# Patient Record
Sex: FEMALE | Race: WHITE | NOT HISPANIC OR LATINO | Employment: OTHER | URBAN - METROPOLITAN AREA
[De-identification: names, ages, dates, MRNs, and addresses within clinical notes are randomized per-mention and may not be internally consistent; named-entity substitution may affect disease eponyms.]

---

## 2024-08-19 LAB
INTERPRETATION: NORMAL
SIGNIFICANT RESULTS: NORMAL
SPECIMEN DESCRIPTION: NORMAL
TEST DETAILS, MDL: NORMAL

## 2024-08-19 PROCEDURE — 81479 UNLISTED MOLECULAR PATHOLOGY: CPT | Performed by: PATHOLOGY

## 2024-08-23 ENCOUNTER — LAB REQUISITION (OUTPATIENT)
Dept: LAB | Facility: CLINIC | Age: 64
End: 2024-08-23

## 2024-08-23 ENCOUNTER — TRANSCRIBE ORDERS (OUTPATIENT)
Dept: OTHER | Age: 64
End: 2024-08-23

## 2024-08-23 ENCOUNTER — PATIENT OUTREACH (OUTPATIENT)
Dept: ONCOLOGY | Facility: CLINIC | Age: 64
End: 2024-08-23
Payer: COMMERCIAL

## 2024-08-23 DIAGNOSIS — C54.1 ENDOMETRIAL CANCER (H): Primary | ICD-10-CM

## 2024-08-23 NOTE — PROGRESS NOTES
"New Patient Hematology / Oncology Nurse Navigator Note     Referral Date: 8/23/24    Referring provider:   Scott Silverman MD   [address]   [place] [id number] [phone] (Work)   [phone] (Fax)     Referring Clinic/Organization: [facility] [place]   [address]   [address]   [place] [id number] [phone]     Referred to: GynOnc    Requested provider (if applicable): First available - did not specify     Evaluation for : \"Endometrial cancer\"     Clinical History (per Nurse review of records provided):    8/19/24 Path:  Final Dx    A.  Endometrium, Endometrial curettings and fibroid, curettage:    High-grade carcinoma possibly arising in a polyp, favor serous,  pending POLE mutational status, see comment   7/31/24 Pelvic US:  IMPRESSION:   1. Circumscribed hyperechoic mass centered in the endometrium measuring up to   1.9 cm. Differential considerations include a submucosal fibroid, large   endometrial polyp and endometrial malignancy. Recommend tissue sampling for   further evaluation. Female pelvic MRI may provide additional diagnostic   information, could be considered for further evaluation.   2. Fibroid uterus.   3. No suspicious adnexal mass.  -- BOOKMARKED   -- BOOKMARKED   -- BOOKMARKED    Clinical Assessment / Barriers to Care (Per Nurse):  Pt lives in [facility]     Records Location: Care Everywhere     Records Needed:   Images from [facility]    Additional testing needed prior to consult:   Pending input from team    Referral updates and Plan:   OUTGOING CALL to pt:  Introduced my role as nurse navigator with [facility] Hematology/Oncology dept and that we have recd the referral to GynOnc from Dr Silverman.  Pt confirms they are aware of the referral and ready to schedule. She is willing to go to any location, hoping to be seen ASAP.     -Explained to pt that I am working to arrange urgent consult with GynOnc, then she will receive " a call from our scheduling intake team and provided our call-back number below if needed.    Message to Dr. Knott/Carmel re: any capacity to add pt next week? Will follow-up pending input from team.     Neli Muñoz, PATRICKN, RN, PHN, OCN  Hematology/Oncology Nurse Navigator  [facility]  [phone]

## 2024-08-23 NOTE — PROGRESS NOTES
Dr. Burt offered to add pt Tuesday 8/27 at 11:00 (between cases). Asked patient to arrive early for appointment and let her know he could be running behind as well as he is fitting her in between cases.

## 2024-08-26 NOTE — TELEPHONE ENCOUNTER
RECORDS STATUS - ALL OTHER DIAGNOSIS      RECORDS RECEIVED FROM:    Appt Date: 8/27/2024    Endometrial cancer (H)   Action    Action Taken 8/26/2024 2:03pm PATRICIA     I called [facility] Ph: [phone]- they will push the US scan to  PACS    The US image is now in PACS      NOTES STATUS DETAILS   OFFICE NOTE from referring provider     OFFICE NOTE from medical oncologist     OPERATIVE REPORT See Biopsy in [facility]    CLINICAL TRIAL TREATMENTS TO DATE     LABS     PATHOLOGY REPORTS Path slides not needed  [facility] biopsy 8/19/2024  Case: [id number]   Endometrium, Endometrial curettings and fibroid, curettage:    High-grade carcinoma possibly arising in a polyp, favor serous,  pending POLE mutational status   ANYTHING RELATED TO DIAGNOSIS     PATHOLOGY FEDEX TRACKING   TRACKING #:   GENONOMIC TESTING     TYPE:     IMAGING (NEED IMAGES & REPORT)     ULTRASOUND In PACS- [facility]     [phone], extn [phone]  US pelvis 7/31/2024    PET     IMAGE DISC FEDEX TRACKING   TRACKING #:

## 2024-08-27 ENCOUNTER — PRE VISIT (OUTPATIENT)
Dept: ONCOLOGY | Facility: CLINIC | Age: 64
End: 2024-08-27
Payer: COMMERCIAL

## 2024-08-27 ENCOUNTER — ONCOLOGY VISIT (OUTPATIENT)
Dept: ONCOLOGY | Facility: CLINIC | Age: 64
End: 2024-08-27
Attending: OBSTETRICS & GYNECOLOGY
Payer: COMMERCIAL

## 2024-08-27 VITALS
SYSTOLIC BLOOD PRESSURE: 145 MMHG | DIASTOLIC BLOOD PRESSURE: 72 MMHG | TEMPERATURE: 98.2 F | WEIGHT: 139.7 LBS | RESPIRATION RATE: 16 BRPM | HEART RATE: 57 BPM | HEIGHT: 67 IN | BODY MASS INDEX: 21.93 KG/M2 | OXYGEN SATURATION: 100 %

## 2024-08-27 DIAGNOSIS — C54.1 ENDOMETRIAL CANCER (H): Primary | ICD-10-CM

## 2024-08-27 DIAGNOSIS — Z80.9 FAMILY HISTORY OF CANCER: ICD-10-CM

## 2024-08-27 PROCEDURE — 99205 OFFICE O/P NEW HI 60 MIN: CPT | Mod: 57 | Performed by: OBSTETRICS & GYNECOLOGY

## 2024-08-27 PROCEDURE — 99213 OFFICE O/P EST LOW 20 MIN: CPT | Performed by: OBSTETRICS & GYNECOLOGY

## 2024-08-27 RX ORDER — TRETINOIN 1 MG/G
1 CREAM TOPICAL PRN
COMMUNITY
Start: 2022-12-13

## 2024-08-27 RX ORDER — PHENAZOPYRIDINE HYDROCHLORIDE 200 MG/1
200 TABLET, FILM COATED ORAL ONCE
Status: CANCELLED | OUTPATIENT
Start: 2024-08-27 | End: 2024-08-27

## 2024-08-27 RX ORDER — METRONIDAZOLE 500 MG/100ML
500 INJECTION, SOLUTION INTRAVENOUS
Status: CANCELLED | OUTPATIENT
Start: 2024-08-27

## 2024-08-27 RX ORDER — CEFAZOLIN SODIUM 2 G/50ML
2 SOLUTION INTRAVENOUS SEE ADMIN INSTRUCTIONS
Status: CANCELLED | OUTPATIENT
Start: 2024-08-27

## 2024-08-27 RX ORDER — CEFAZOLIN SODIUM 2 G/50ML
2 SOLUTION INTRAVENOUS
Status: CANCELLED | OUTPATIENT
Start: 2024-08-27

## 2024-08-27 RX ORDER — HEPARIN SODIUM 5000 [USP'U]/.5ML
5000 INJECTION, SOLUTION INTRAVENOUS; SUBCUTANEOUS
Status: CANCELLED | OUTPATIENT
Start: 2024-08-27

## 2024-08-27 ASSESSMENT — PAIN SCALES - GENERAL: PAINLEVEL: NO PAIN (0)

## 2024-08-27 NOTE — NURSING NOTE
"Oncology Rooming Note    August 27, 2024 11:07 AM   Estelita Waters is a 64 year old female who presents for:    Chief Complaint   Patient presents with    Oncology Clinic Visit     New eval endometrial CA     Initial Vitals: BP (!) 145/72 (BP Location: Right arm, Patient Position: Right side, Cuff Size: Adult Regular)   Pulse 57   Temp 98.2  F (36.8  C) (Oral)   Resp 16   Ht 1.705 m (5' 7.13\")   Wt 63.4 kg (139 lb 11.2 oz)   SpO2 100%   BMI 21.80 kg/m   Estimated body mass index is 21.8 kg/m  as calculated from the following:    Height as of this encounter: 1.705 m (5' 7.13\").    Weight as of this encounter: 63.4 kg (139 lb 11.2 oz). Body surface area is 1.73 meters squared.  No Pain (0) Comment: Data Unavailable   No LMP recorded.  Allergies reviewed: Yes  Medications reviewed: Yes    Medications: Medication refills not needed today.  Pharmacy name entered into [employer]: [id number] [id number] [id number]    Frailty Screening:   Is the patient here for a new oncology consult visit in cancer care? 1. Yes. Over the past month, have you experienced difficulty or required a caregiver to assist with:   1. Balance, walking or general mobility (including any falls)? NO  2. Completion of self-care tasks such as bathing, dressing, toileting, grooming/hygiene?  NO  3. Concentration or memory that affects your daily life?  NO       Clinical concerns: Would like to discuss what the future may look like.      Chelsey Vallecillo             "

## 2024-08-27 NOTE — LETTER
2024      Estleita Waters  [address] [place] [address]      Dear Colleague,    Thank you for referring your patient, Estelita Waters, to the [facility]. Please see a copy of my visit note below.                            Consult Notes on Referred Patient    Date: 2024       Dr. Bong Stanley MD  No address on file       RE: Estelita Waters  : 1960  KAYLEIGH: 2024    Dear Dr. Referred Self:    I had the pleasure of seeing your patient Estelita Waters here at the Gynecologic Cancer Clinic at the [facility] on 2024.  As you know she is a very pleasant 64 year old woman with a recent diagnosis of high grade serous uterine cancer.  Given these findings she was subsequently sent to the Gynecologic Cancer Clinic for new patient consultation.  G2, P2 went through menopause in her late 40s.  She has used vaginal estrogen cream and most recently the Estrace ring.  Started to have some postmenopausal bleeding in April and was subsequently underwent a D&C showing high-grade serous uterine cancer with mild p53 staining suggesting p53 deletion.  This is consistent with p53 abnormal high-grade serous cancer.  Ultrasound shows thickened endometrial stripe as well as a 1.9 cm round lesion in the uterus.  She is otherwise eating and drinking well has no nausea vomit fever chills normal urinary bowel function.  No B symptoms.    Past Medical History:  Stage 0 melanoma on her back    Past Surgical History:  2 low-transverse  sections.  Breast augmentation.  Wrist surgery.      Health Maintenance:  Health Maintenance Due   Topic Date Due     ADVANCE CARE PLANNING  Never done     GLUCOSE  Never done     HIV SCREENING  Never done     HEPATITIS C SCREENING  Never done     PAP  Never done     LIPID  Never done     RSV VACCINE (Pregnancy & 60+) (1 - 1-dose 60+ series) Never done     COVID-19 Vaccine (2023-24 season) 2023     PHQ-2 (once per calendar  "year)  Never done       No history of abnormal Pap smears.  Last colonoscopy in 2021 with benign polyps.  Last colonoscopy after 10 years.      Current Medications:     currently has no medications in their medication list.       Allergies:     Patient has no known allergies.        Social History:     Social History     Tobacco Use     Smoking status: Never     Smokeless tobacco: Never   Substance Use Topics     Alcohol use: Yes     Alcohol/week: 4.0 standard drinks of alcohol     Types: 4 Glasses of wine per week       History   Drug Use Unknown           Family History:   Son had thyroid cancer with V600 E mutation.      Physical Exam:     BP (!) 145/72 (BP Location: Right arm, Patient Position: Right side, Cuff Size: Adult Regular)   Pulse 57   Temp 98.2  F (36.8  C) (Oral)   Resp 16   Ht 1.705 m (5' 7.13\")   Wt 63.4 kg (139 lb 11.2 oz)   SpO2 100%   BMI 21.80 kg/m    Body mass index is 21.8 kg/m .    General Appearance: healthy and alert, no distress     Musculoskeletal: extremities non tender and without edema    Skin: no lesions or rashes     Neurological: normal gait, no gross defects     Psychiatric: appropriate mood and affect                               Hematological: normal cervical, supraclavicular and inguinal lymph nodes     Gastrointestinal:       abdomen soft, non-tender, non-distended, no organomegaly or masses, well-healed Pfannenstiel incision.    Genitourinary: External genitalia and urethral meatus appears normal.  Vagina is smooth without nodularity or masses.  Cervix appears normal and without lesions.  Bimanual exam reveal no masses, nodularity or fullness.  Recto-vaginal exam confirms these findings.      Assessment:    Estelita Waters is a 64 year old woman with a new diagnosis of high grade serous uterine cancer.     A total of 60 minutes was spent with the patient, documentation, coordination of care, chart review in counseling the patient and/or treatment planning.      Plan: "     High grade serous uterine cancer  Family history of early onset cancer  Personal history of Melanoma    We discussed in detail the pathophysiology of endometrial cancer as well as molecular classification and treatment.  We will plan to proceed with a  as well as a CT scan of the chest abdomen pelvis.  Depending on those findings we will likely proceed with a robotic hysterectomy bilateral salpingo-oophorectomy bilateral sentinel lymph node sampling, omental sampling.  We will have her see my colleagues in anesthesia for preoperative optimization.  We did discuss also systemic treatment of endometrial cancer specifically high-grade serous.  In addition we will have her see our genetic counselors given her own personal history of melanoma as well as thyroid cancer in her son.  We will follow test her tomorrow for her to amplification.  Patient as well as her  agree with this plan they are very appreciative for care all questions were answered.    Risks, benefits and alternatives to proceed discussed in detail with the patient. Risks include but are not limited to bleeding, infection, possible injury to surrounding organs including bowel, bladder, ureter, need for second procedure/surgery related to complications from first procedure, postoperative medical complications such as cardiopulmonary events, lymphedema, lymphocyst, thromboembolic events.         Thank you for allowing us to participate in the care of your patient.         Sincerely,    Wenceslao Burt MD, MS    Department of Obstetrics and Gynecology   Division of Gynecologic Oncology   [facility]  Phone: [phone]    CC  Patient Care Team:  Katie Centeno DO as PCP - General  Wenceslao Burt MD as MD (Gynecologic Oncology)  SELF, REFERRED        Again, thank you for allowing me to participate in the care of your patient.        Sincerely,        Wenceslao Burt MD

## 2024-08-27 NOTE — PATIENT INSTRUCTIONS
PLAN:   CT scan and  (lab)  PAC Visit (pre op)  Surgery: HYSTERECTOMY, TOTAL, ROBOT-ASSISTED, WITH BILATERAL SALPINGO-OOPHORECTOMY, AND BILATERAL PELVIC sentinel LYMPHADENECTOMY, omentectomy, possible open - Bilateral     At some point:  Genetic counseling (they are scheduling months out)

## 2024-08-27 NOTE — PROGRESS NOTES
Consult Notes on Referred Patient    Date: 2024       Dr. Bong Stanley MD  No address on file       RE: Estelita Waters  : 1960  KAYLEIGH: 2024    Dear Dr. Referred Self:    I had the pleasure of seeing your patient Estelita Waters here at the Gynecologic Cancer Clinic at the [facility] on 2024.  As you know she is a very pleasant 64 year old woman with a recent diagnosis of high grade serous uterine cancer.  Given these findings she was subsequently sent to the Gynecologic Cancer Clinic for new patient consultation.  G2, P2 went through menopause in her late 40s.  She has used vaginal estrogen cream and most recently the Estrace ring.  Started to have some postmenopausal bleeding in April and was subsequently underwent a D&C showing high-grade serous uterine cancer with mild p53 staining suggesting p53 deletion.  This is consistent with p53 abnormal high-grade serous cancer.  Ultrasound shows thickened endometrial stripe as well as a 1.9 cm round lesion in the uterus.  She is otherwise eating and drinking well has no nausea vomit fever chills normal urinary bowel function.  No B symptoms.    Past Medical History:  Stage 0 melanoma on her back    Past Surgical History:  2 low-transverse  sections.  Breast augmentation.  Wrist surgery.      Health Maintenance:  Health Maintenance Due   Topic Date Due    ADVANCE CARE PLANNING  Never done    GLUCOSE  Never done    HIV SCREENING  Never done    HEPATITIS C SCREENING  Never done    PAP  Never done    LIPID  Never done    RSV VACCINE (Pregnancy & 60+) (1 - 1-dose 60+ series) Never done    COVID-19 Vaccine (2023- season) 2023    PHQ-2 (once per calendar year)  Never done       No history of abnormal Pap smears.  Last colonoscopy in  with benign polyps.  Last colonoscopy after 10 years.      Current Medications:     currently has no medications in their medication list.       Allergies:  "    Patient has no known allergies.        Social History:     Social History     Tobacco Use    Smoking status: Never    Smokeless tobacco: Never   Substance Use Topics    Alcohol use: Yes     Alcohol/week: 4.0 standard drinks of alcohol     Types: 4 Glasses of wine per week       History   Drug Use Unknown           Family History:   Son had thyroid cancer with V600 E mutation.      Physical Exam:     BP (!) 145/72 (BP Location: Right arm, Patient Position: Right side, Cuff Size: Adult Regular)   Pulse 57   Temp 98.2  F (36.8  C) (Oral)   Resp 16   Ht 1.705 m (5' 7.13\")   Wt 63.4 kg (139 lb 11.2 oz)   SpO2 100%   BMI 21.80 kg/m    Body mass index is 21.8 kg/m .    General Appearance: healthy and alert, no distress     Musculoskeletal: extremities non tender and without edema    Skin: no lesions or rashes     Neurological: normal gait, no gross defects     Psychiatric: appropriate mood and affect                               Hematological: normal cervical, supraclavicular and inguinal lymph nodes     Gastrointestinal:       abdomen soft, non-tender, non-distended, no organomegaly or masses, well-healed Pfannenstiel incision.    Genitourinary: External genitalia and urethral meatus appears normal.  Vagina is smooth without nodularity or masses.  Cervix appears normal and without lesions.  Bimanual exam reveal no masses, nodularity or fullness.  Recto-vaginal exam confirms these findings.      Assessment:    Estelita Waters is a 64 year old woman with a new diagnosis of high grade serous uterine cancer.     A total of 60 minutes was spent with the patient, documentation, coordination of care, chart review in counseling the patient and/or treatment planning.      Plan:     High grade serous uterine cancer  Family history of early onset cancer  Personal history of Melanoma    We discussed in detail the pathophysiology of endometrial cancer as well as molecular classification and treatment.  We will plan to " proceed with a  as well as a CT scan of the chest abdomen pelvis.  Depending on those findings we will likely proceed with a robotic hysterectomy bilateral salpingo-oophorectomy bilateral sentinel lymph node sampling, omental sampling.  We will have her see my colleagues in anesthesia for preoperative optimization.  We did discuss also systemic treatment of endometrial cancer specifically high-grade serous.  In addition we will have her see our genetic counselors given her own personal history of melanoma as well as thyroid cancer in her son.  We will follow test her tomorrow for her to amplification.  Patient as well as her  agree with this plan they are very appreciative for care all questions were answered.    Risks, benefits and alternatives to proceed discussed in detail with the patient. Risks include but are not limited to bleeding, infection, possible injury to surrounding organs including bowel, bladder, ureter, need for second procedure/surgery related to complications from first procedure, postoperative medical complications such as cardiopulmonary events, lymphedema, lymphocyst, thromboembolic events.         Thank you for allowing us to participate in the care of your patient.         Sincerely,    Wenceslao Burt MD, MS    Department of Obstetrics and Gynecology   Division of Gynecologic Oncology   [facility]  Phone: [phone]    CC  Patient Care Team:  Katie Centeno DO as PCP - General  Wenceslao Burt MD as MD (Gynecologic Oncology)  SELF, REFERRED

## 2024-09-03 ENCOUNTER — TELEPHONE (OUTPATIENT)
Dept: ONCOLOGY | Facility: CLINIC | Age: 64
End: 2024-09-03
Payer: COMMERCIAL

## 2024-09-03 DIAGNOSIS — C54.1 ENDOMETRIAL CANCER (H): Primary | ICD-10-CM

## 2024-09-03 DIAGNOSIS — Z80.9 FAMILY HISTORY OF CANCER: ICD-10-CM

## 2024-09-03 NOTE — TELEPHONE ENCOUNTER
Spoke with patient    Patient is schedule for surgery with: Dr. Burt    Surgery Date: 9/24     Location: [facility]    H&P: to be completed by: completed by PAC clinic, scheduled on 9/6     Post-op: TBD    Patient was informed that a PAC RN will provide arrival time and instructions for surgery at the time of the appointment: Yes    Patient aware times are subject to change up until day before surgery.     Patient questions/concerns: N/A     Surgery packet to be sent via [employer] on 9/3/2024 at 5:23 PM

## 2024-09-04 ENCOUNTER — PATIENT OUTREACH (OUTPATIENT)
Dept: ONCOLOGY | Facility: CLINIC | Age: 64
End: 2024-09-04
Payer: COMMERCIAL

## 2024-09-04 ENCOUNTER — HOSPITAL ENCOUNTER (OUTPATIENT)
Dept: CT IMAGING | Facility: CLINIC | Age: 64
Discharge: HOME | End: 2024-09-04
Attending: OBSTETRICS & GYNECOLOGY
Payer: COMMERCIAL

## 2024-09-04 ENCOUNTER — LAB (OUTPATIENT)
Dept: LAB | Facility: CLINIC | Age: 64
End: 2024-09-04
Attending: OBSTETRICS & GYNECOLOGY
Payer: COMMERCIAL

## 2024-09-04 DIAGNOSIS — C54.1 ENDOMETRIAL CANCER (H): ICD-10-CM

## 2024-09-04 LAB — CANCER AG125 SERPL-ACNC: 12 U/ML

## 2024-09-04 PROCEDURE — 86304 IMMUNOASSAY TUMOR CA 125: CPT

## 2024-09-04 PROCEDURE — 250N000009 HC RX 250: Performed by: OBSTETRICS & GYNECOLOGY

## 2024-09-04 PROCEDURE — 250N000011 HC RX IP 250 OP 636: Performed by: OBSTETRICS & GYNECOLOGY

## 2024-09-04 PROCEDURE — 71260 CT THORAX DX C+: CPT

## 2024-09-04 PROCEDURE — 36415 COLL VENOUS BLD VENIPUNCTURE: CPT

## 2024-09-04 RX ORDER — IOPAMIDOL 755 MG/ML
68 INJECTION, SOLUTION INTRAVASCULAR ONCE
Status: COMPLETED | OUTPATIENT
Start: 2024-09-04 | End: 2024-09-04

## 2024-09-04 RX ADMIN — SODIUM CHLORIDE 59 ML: 9 INJECTION, SOLUTION INTRAVENOUS at 09:27

## 2024-09-04 RX ADMIN — IOPAMIDOL 68 ML: 755 INJECTION, SOLUTION INTRAVENOUS at 09:26

## 2024-09-04 NOTE — TELEPHONE ENCOUNTER
FUTURE VISIT INFORMATION      SURGERY INFORMATION:  Date: 24  Location: [facility] or  Surgeon:  Wenceslao Burt MD   Anesthesia Type:  general  Procedure: HYSTERECTOMY, TOTAL, ROBOT-ASSISTED, WITH BILATERAL SALPINGO-OOPHORECTOMY, AND BILATERAL PELVIC sentinel LYMPHADENECTOMY, omentectomy, possible open   Consult: ov 24    RECORDS REQUESTED FROM:       Primary Care Provider: Norma    Most recent EKG+ Tracin24

## 2024-09-04 NOTE — PROGRESS NOTES
Writer received referral to Cancer Risk Management/Genetic Counseling.    Referred for:   Endometrial cancer (H)  Family history of cancer    Referred By    Provider Department Location Phone   Wenceslao Burt MD  Gyn Oncology [facility] [phone]       Referral reviewed for appropriate plan, and sent to New Patient Scheduling ([phone]) for completion.    Gilda Ramos RN, BSN  Oncology New Patient Nurse Navigator   [facility]

## 2024-09-06 ENCOUNTER — ANESTHESIA EVENT (OUTPATIENT)
Dept: SURGERY | Facility: CLINIC | Age: 64
End: 2024-09-06
Payer: COMMERCIAL

## 2024-09-06 ENCOUNTER — PRE VISIT (OUTPATIENT)
Dept: SURGERY | Facility: CLINIC | Age: 64
End: 2024-09-06

## 2024-09-06 ENCOUNTER — LAB (OUTPATIENT)
Dept: LAB | Facility: CLINIC | Age: 64
End: 2024-09-06
Payer: COMMERCIAL

## 2024-09-06 ENCOUNTER — OFFICE VISIT (OUTPATIENT)
Dept: SURGERY | Facility: CLINIC | Age: 64
End: 2024-09-06
Payer: COMMERCIAL

## 2024-09-06 VITALS
SYSTOLIC BLOOD PRESSURE: 134 MMHG | OXYGEN SATURATION: 99 % | RESPIRATION RATE: 16 BRPM | DIASTOLIC BLOOD PRESSURE: 88 MMHG | TEMPERATURE: 98.4 F | WEIGHT: 137.9 LBS | HEART RATE: 55 BPM | HEIGHT: 67 IN | BODY MASS INDEX: 21.64 KG/M2

## 2024-09-06 DIAGNOSIS — C55 MALIGNANT NEOPLASM OF UTERUS, UNSPECIFIED SITE (H): Primary | ICD-10-CM

## 2024-09-06 DIAGNOSIS — C55 MALIGNANT NEOPLASM OF UTERUS, UNSPECIFIED SITE (H): ICD-10-CM

## 2024-09-06 LAB
ANION GAP SERPL CALCULATED.3IONS-SCNC: 8 MMOL/L (ref 7–15)
BUN SERPL-MCNC: 16.1 MG/DL (ref 8–23)
CALCIUM SERPL-MCNC: 10.4 MG/DL (ref 8.8–10.4)
CHLORIDE SERPL-SCNC: 105 MMOL/L (ref 98–107)
CREAT SERPL-MCNC: 0.68 MG/DL (ref 0.51–0.95)
EGFRCR SERPLBLD CKD-EPI 2021: >90 ML/MIN/1.73M2
GLUCOSE SERPL-MCNC: 102 MG/DL (ref 70–99)
HCO3 SERPL-SCNC: 27 MMOL/L (ref 22–29)
POTASSIUM SERPL-SCNC: 5.3 MMOL/L (ref 3.4–5.3)
SODIUM SERPL-SCNC: 140 MMOL/L (ref 135–145)

## 2024-09-06 PROCEDURE — 99203 OFFICE O/P NEW LOW 30 MIN: CPT | Performed by: NURSE PRACTITIONER

## 2024-09-06 PROCEDURE — 36415 COLL VENOUS BLD VENIPUNCTURE: CPT | Performed by: PATHOLOGY

## 2024-09-06 PROCEDURE — 80048 BASIC METABOLIC PNL TOTAL CA: CPT | Performed by: PATHOLOGY

## 2024-09-06 RX ORDER — ZOLPIDEM TARTRATE 5 MG/1
5 TABLET ORAL
COMMUNITY
Start: 2024-08-07

## 2024-09-06 RX ORDER — IBUPROFEN 600 MG/1
600 TABLET ORAL EVERY 6 HOURS PRN
Status: ON HOLD | COMMUNITY
Start: 2024-08-19 | End: 2024-09-24

## 2024-09-06 ASSESSMENT — PAIN SCALES - GENERAL: PAINLEVEL: NO PAIN (0)

## 2024-09-06 NOTE — RESULT ENCOUNTER NOTE
Pamela Capone,    Your test results are attached.  Your non-fasting blood test is normal and good for surgery.    Have a great trip!        Yamini Edward DNP, RN, ANP-C

## 2024-09-06 NOTE — H&P
Pre-Operative H & P     CC:  Preoperative exam to assess for increased cardiopulmonary risk while undergoing surgery and anesthesia.    Date of Encounter: 2024  Primary Care Physician:  Katie Centeno     Reason for visit:   Encounter Diagnosis   Name Primary?    Malignant neoplasm of uterus, unspecified site (H) Yes       HPI  Estelita Waters is a 64 year old female who presents for pre-operative H & P in preparation for  Procedure Information       Case: [id number] Date/Time: 24 0800    Procedure: HYSTERECTOMY, TOTAL, ROBOT-ASSISTED, WITH BILATERAL SALPINGO-OOPHORECTOMY, AND BILATERAL PELVIC sentinel LYMPHADENECTOMY, omentectomy, possible open (Bilateral: Abdomen)    Anesthesia type: General    Diagnosis: Endometrial cancer (H) [C54.1]    Pre-op diagnosis: Endometrial cancer (H) [C54.1]    Location:  OR  /  OR    Providers: Wenceslao Burt MD            Estelita Waters is a 64 year old female with a history of melanoma that has has a new diagnosis of uterine cancer. She presented earlier this past summer with PMB.  A pelvic US was done for evaluation and noted a mass in the endometrium.  A biopsy was later done and she was diagnosed with high grade serous uterine cancer.  She has consulted with Dr. Burt and the above listed procedure has been recommended for treatment.     History is obtained from the patient and chart review    Hx of abnormal bleeding or anti-platelet use: none    Menstrual history: No LMP recorded (lmp unknown). Patient is postmenopausal.:      Past Medical History  Past Medical History:   Diagnosis Date    History of melanoma     Uterine cancer (H)        Past Surgical History  Past Surgical History:   Procedure Laterality Date     SECTION      MOHS MICROGRAPHIC PROCEDURE      DC BREAST AUGMENTATION      WRIST SURGERY         Prior to Admission Medications  Current Outpatient Medications   Medication Sig Dispense Refill     MG tablet Take 600 mg by  mouth every 6 hours as needed.      tretinoin (RETIN-A) 0.1 % external cream Apply 1 Application topically as needed.      zolpidem (AMBIEN) 5 MG tablet Take 5 mg by mouth nightly as needed.         Allergies  No Known Allergies    Social History  Social History     Socioeconomic History    Marital status:      Spouse name: Not on file    Number of children: 2    Years of education: Not on file    Highest education level: Not on file   Occupational History    Occupation: retired   Tobacco Use    Smoking status: Never    Smokeless tobacco: Never   Substance and Sexual Activity    Alcohol use: Yes     Alcohol/week: 4.0 standard drinks of alcohol     Types: 4 Glasses of wine per week    Drug use: Never    Sexual activity: Not on file   Other Topics Concern    Not on file   Social History Narrative    Not on file     Social Determinants of Health     Financial Resource Strain: Low Risk  (6/30/2022)    Received from [facility] [employer] [facility]    Overall Financial Resource Strain (CARDIA)     Difficulty of Paying Living Expenses: Not hard at all   Food Insecurity: No Food Insecurity (6/30/2022)    Received from [facility] [employer] [facility]    Hunger Vital Sign     Worried About Running Out of Food in the Last Year: Never true     Ran Out of Food in the Last Year: Never true   Transportation Needs: No Transportation Needs (6/30/2022)    Received from [facility] [employer] [facility]    PRAPARE - Transportation     Lack of Transportation (Medical): No     Lack of Transportation (Non-Medical): No   Physical Activity: Sufficiently Active (7/22/2024)    Received from [facility] [employer]    Exercise Vital Sign     Days of Exercise per Week: 7 days     Minutes of Exercise per Session: 60 min   Stress: No Stress Concern Present (7/22/2024)    Received from "[employer] [employer] [employer] [facility]  - Occupational Stress Questionnaire     Feeling of Stress : Not at all   Social Connections: Socially Integrated (7/22/2024)    Received from [facility]    Social Connection and Isolation Panel [NHANES]     Frequency of Communication with Friends and Family: Three times a week     Frequency of Social Gatherings with Friends and Family: More than three times a week     Attends [employer] Services: 1 to 4 times per year     Active Member of Clubs or Organizations: Yes     Attends Club or Organization Meetings: More than 4 times per year     Marital Status:    Interpersonal Safety: Patient Declined (8/19/2024)    [facility]     Do you feel UNSAFE in any of your personal relationships with your family members or any other acquaintances?: Deferred   Housing Stability: Not on file       Family History  Family History   Problem Relation Age of Onset    Pulmonary Embolism Mother         unknown cause    Deep Vein Thrombosis (DVT) Mother     Anesthesia Reaction No family hx of        Review of Systems  The complete review of systems is negative other than noted in the HPI or here.   Anesthesia Evaluation   Pt has had prior anesthetic.     No history of anesthetic complications       ROS/MED HX  ENT/Pulmonary:     (+)                              recent URI, resolved, covid in July:        Neurologic:  - neg neurologic ROS     Cardiovascular:     (+)  - -   -  - -                                 No previous cardiac testing  (-) PHELPS   METS/Exercise Tolerance: >4 METS Comment: Runs, mountain bikes, hikes, walks and plays pickleball.  Also does some weight lifting.    Hematologic:  - neg hematologic  ROS     Musculoskeletal:  - neg musculoskeletal ROS     GI/Hepatic:  - neg GI/hepatic ROS     Renal/Genitourinary: Comment: Endometrial cancer      Endo:  - neg endo ROS     Psychiatric/Substance Use:  - neg psychiatric  "ROS     Infectious Disease:  - neg infectious disease ROS     Malignancy:   (+) Malignancy, History of Skin and Other.Skin CA Remission status post.  Other CA endometrial cancer Remission status post.    Other:  - neg other ROS          /88 (BP Location: Right arm, Patient Position: Sitting, Cuff Size: Adult Regular)   Pulse 55   Temp 98.4  F (36.9  C) (Oral)   Resp 16   Ht 1.704 m (5' 7.1\")   Wt 62.6 kg (137 lb 14.4 oz)   LMP  (LMP Unknown)   SpO2 99%   Breastfeeding No   BMI 21.53 kg/m      Physical Exam   Constitutional: Awake, alert, cooperative, no apparent distress, and appears stated age.  Eyes: Pupils equal, round and reactive to light, extra ocular muscles intact, sclera clear, conjunctiva normal.  HENT: Normocephalic, oral pharynx with moist mucus membranes, good dentition. No goiter appreciated.   Respiratory: Clear to auscultation bilaterally, no crackles or wheezing.  Cardiovascular: Regular rate and rhythm, normal S1 and S2, and no murmur noted.  Carotids +2, no bruits. No edema. Palpable pulses to radial  DP and PT arteries.   GI: Normal bowel sounds, soft, non-distended, non-tender, no masses palpated, no hepatosplenomegaly.    Lymph/Hematologic: No cervical lymphadenopathy and no supraclavicular lymphadenopathy.  Genitourinary:  deferred  Skin: Warm and dry.  No rashes at anticipated surgical site.   Musculoskeletal: Full ROM of neck. There is no redness, warmth, or swelling of the exposed joints. Gross motor strength is normal.    Neurologic: Awake, alert, oriented to name, place and time. Cranial nerves II-XII are grossly intact. Gait is normal.   Neuropsychiatric: Calm, cooperative. Normal affect.     Prior Labs/Diagnostic Studies   All labs and imaging personally reviewed     EKG/ stress test - if available please see in ROS above     HEMOGRAM  Order: [id number]   suggestion  Information displayed in this report may not trend or trigger automated decision support. "     Component  Ref Range & Units 1 mo ago   WBC  4.0 - 11.0 X10*3u/L 6.2   RBC  3.80 - 5.20 X10*6/uL 4.24   HGB  12.0 - 16.0 g/dl 13.3   HCT  35.0 - 47.0 % 38.7   MCV  80 - 98 fL 91.3   MCH  27.0 - 34.0 pg 31.4   MCHC  32 - 36 g/dl 34.4   PLT  150 - 420 X10*3/uL 250   RDW-CV  11.6 - 14.4 % 11.6   RDW-SD  36.5 - 46.3 fL 39.3     Specimen Collected: 07/29/24  8:43 AM    Performed by: [facility] LABORATORY Last Resulted: 07/29/24  9:50 AM   Received From: [facility]  Result Received: 08/23/24 10:50 AM    View Encounter        Received Information   Contains abnormal data BASIC METABOLIC PANEL  Order: [id number]   suggestion  Information displayed in this report may not trend or trigger automated decision support.     Component  Ref Range & Units 1 mo ago   Sodium  135 - 144 mmol/L 138   Potassium  3.4 - 5.1 mmol/L 5.2 High    Chloride  98 - 107 mmol/L 103   Carbon Dioxide  22 - 30 mmol/L 32 High    Calcium  8.6 - 10.3 mg/dL 9.8   Glucose  74 - 100 mg/dL 94   Blood Urea nitrogen  7 - 17 mg/dL 17   Creatinine  0.52 - 1.04 mg/dL 0.67   Anion Gap  5 - 15 mmol/L 3 Low    Glomerular Filtration Rate  >60 mL/min/1.73 m*2 >60         The patient's records and results personally reviewed by this provider.         Outside records reviewed from: Care Everywhere    LAB/DIAGNOSTIC STUDIES TODAY:    Component      Latest Ref Rng 9/6/2024  11:02 AM   Sodium      135 - 145 mmol/L 140    Potassium      3.4 - 5.3 mmol/L 5.3    Chloride      98 - 107 mmol/L 105    Carbon Dioxide (CO2)      22 - 29 mmol/L 27    Anion Gap      7 - 15 mmol/L 8    Urea Nitrogen      8.0 - 23.0 mg/dL 16.1    Creatinine      0.51 - 0.95 mg/dL 0.68    GFR Estimate      >60 mL/min/1.73m2 >90    Calcium      8.8 - 10.4 mg/dL 10.4    Glucose      70 - 99 mg/dL 102 (H)       Legend:  (H) High    Assessment    Estelita Waters is a 64 year old female seen as a PAC referral for risk assessment and optimization for  "anesthesia.    Plan/Recommendations  Pt will be optimized for the proposed procedure.  See below for details on the assessment, risk, and preoperative recommendations    NEUROLOGY  - No history of TIA, CVA or seizure    -Post Op delirium risk factors:  No risk identified    ENT  - No current airway concerns.  Will need to be reassessed day of surgery.  Mallampati: I  TM: > 3    CARDIAC  - No history of CAD, Hypertension, and Afib  - METS (Metabolic Equivalents)  Patient performs 4 or more METS exercise without symptoms             Total Score: 0      RCRI-Low risk: Class 2 0.9% complication rate             Total Score: 1    RCRI: High Risk Surgery        PULMONARY    RADHA Low Risk             Total Score: 1    RADHA: Over 50 ys old      - Denies asthma or inhaler use  - Tobacco History    History   Smoking Status    Never   Smokeless Tobacco    Never       GI  - denies GERD  PONV High Risk  Total Score: 3           1 AN PONV: Pt is Female    1 AN PONV: Patient is not a current smoker    1 AN PONV: Intended Post Op Opioids        /RENAL  - uterine cancer    ENDOCRINE    - BMI: Estimated body mass index is 21.53 kg/m  as calculated from the following:    Height as of this encounter: 1.704 m (5' 7.1\").    Weight as of this encounter: 62.6 kg (137 lb 14.4 oz).  Healthy Weight (BMI 18.5-24.9)  - No history of Diabetes Mellitus    HEME  VTE High Risk 3%             Total Score: 10    VTE: Greater than 59 yrs old    VTE: Family Hx of VTE    VTE: Current cancer      - No antiplatelet use.      MSK  Patient is NOT Frail             Total Score: 0            Different anesthesia methods/types have been discussed with the patient, but they are aware that the final plan will be decided by the assigned anesthesia provider on the date of service.      The patient is optimized for their procedure. AVS with information on surgery time/arrival time, meds and NPO status given by nursing staff. No further diagnostic testing " indicated.      On the day of service:     Prep time: 5 minutes  Visit time: 15 minutes  Documentation time: 7 minutes  ------------------------------------------  Total time: 27 minutes      VANESA Pelayo CNP  Preoperative Assessment Center  [facility]  Clinic and Surgery Center  Phone: [phone]  Fax: [phone]

## 2024-09-06 NOTE — PATIENT INSTRUCTIONS
Preparing for Your Surgery      Name:  Estelita Waters   MRN:  [id number]   :  1960   Today's Date:  2024       Arriving for surgery:  Surgery date:  24  Arrival time:  6:00 am  Surgery time: 8:00 am    Please come to:     Please come to:       [facility]    [address] [place]  [address]     The [facility] Patient/Visitor Ramp is at [address]. Patients and visitors who self-park will receive the reduced hospital parking rate. If the Patient /Visitor Ramp is full, please follow the signs to the [employer] car park located at the main hospital entrance.       parking is available (24 hours/ 7 days a week)      Discounted parking pass options are available for patients and visitors. They can be purchased at the [employer] desk at the main hospital entrance.     -    Stop at the security desk and they will direct surgery patients to the [facility] [place] [phone]        - If you need directions, a wheelchair or an escort please stop at the Information/security desk in the lobby.     What can I eat or drink?  -  You may eat and drink normally up to 8 hours prior to arrival time. (Until 10:00 pm on 24)  -  You may have clear liquids until 2 hours prior to arrival time. (Until 4:00 am on 24)    Examples of clear liquids:  Water  Clear broth  Juices (apple, white grape, white cranberry  and cider) without pulp  Noncarbonated, powder based beverages  (lemonade and Rocael-Aid)  Sodas (Sprite, 7-Up, ginger ale and seltzer)  Coffee or tea (without milk or cream)  Gatorade    -  No Alcohol or cannabis products for at least 24 hours before surgery.     Which medicines can I take?    Hold Ibuprofen (Advil, Motrin) for 1 day(s) before surgery--unless otherwise directed by surgeon.  Hold Naproxen (Aleve) for 4 days before surgery.    -  DO NOT take  these medications the day of surgery:   No creams     Ok to take Acetaminophen (Tylenol) as needed      How do I prepare myself?  - Please take 2 showers (one the night prior to surgery and one the morning of surgery) using Scrubcare or Hibiclens soap.    Use this soap only from the neck to your toes.     Leave the soap on your skin for one minute--then rinse thoroughly.      You may use your own shampoo and conditioner. No other hair products.   - Please remove all jewelry and body piercings.  - No lotions, deodorants or fragrance.  - No makeup or fingernail polish.   - Bring your ID and insurance card.    -If you use a CPAP machine, please bring the CPAP machine, tubing, and mask to hospital.    -If you have a Deep Brain Stimulator, Spinal Cord Stimulator, or any Neuro Stimulator device---you must bring the remote control to the hospital.      ALL PATIENTS GOING HOME THE SAME DAY OF SURGERY ARE REQUIRED TO HAVE A RESPONSIBLE ADULT TO DRIVE AND BE IN ATTENDANCE WITH THEM FOR 24 HOURS FOLLOWING SURGERY.    Covid testing policy as of 12/06/2022  Your surgeon will notify and schedule you for a COVID test if one is needed before surgery--please direct any questions or COVID symptoms to your surgeon      Questions or Concerns:    - For any questions regarding the day of surgery or your hospital stay, please contact the Pre Admission Nursing Office at [phone].       - If you have health changes between today and your surgery, please call your surgeon.       - For questions after surgery, please call your surgeons office.           Current Visitor Guidelines    You may have 2 visitors in the pre op area.    Visiting hours: 8 a.m. to 8:30 p.m.    Patients confirmed or suspected to have symptoms of COVID 19 or flu:     No visitors allowed for adult patients.   Children (under age 18) can have 1 named visitor.     People who are sick or showing symptoms of COVID 19 or flu:    Are not allowed to visit patients--we can  only make exceptions in special situations.       Please follow these guidelines for your visit:          Please maintain social distance          Masking is optional--however at times you may be asked to wear a mask for the safety of yourself and others     Clean your hands with alcohol hand . Do this when you arrive at and leave the building and patient room,    And again after you touch your mask or anything in the room.     Go directly to and from the room you are visiting.     Stay in the patient s room during your visit. Limit going to other places in the hospital as much as possible     Leave bags and jackets at home or in the car.     For everyone s health, please don t come and go during your visit. That includes for smoking   during your visit.

## 2024-09-09 NOTE — TELEPHONE ENCOUNTER
Called and spoke with patient regarding scheduling post-op appointment with Dr. Burt.    Post-op: 10/9/24, 12:30 PM, [facility]    Patient had no further questions.    Maxim Reynolds on 9/9/2024 at 1:00 PM

## 2024-09-24 ENCOUNTER — ANESTHESIA (OUTPATIENT)
Dept: SURGERY | Facility: CLINIC | Age: 64
End: 2024-09-24
Payer: COMMERCIAL

## 2024-09-24 ENCOUNTER — HOSPITAL ENCOUNTER (OUTPATIENT)
Facility: CLINIC | Age: 64
Discharge: HOME | End: 2024-09-24
Attending: OBSTETRICS & GYNECOLOGY | Admitting: OBSTETRICS & GYNECOLOGY
Payer: COMMERCIAL

## 2024-09-24 VITALS
SYSTOLIC BLOOD PRESSURE: 126 MMHG | RESPIRATION RATE: 16 BRPM | BODY MASS INDEX: 21.73 KG/M2 | DIASTOLIC BLOOD PRESSURE: 68 MMHG | TEMPERATURE: 97.4 F | HEIGHT: 67 IN | WEIGHT: 138.45 LBS | OXYGEN SATURATION: 96 % | HEART RATE: 64 BPM

## 2024-09-24 DIAGNOSIS — C54.1 ENDOMETRIAL CANCER (H): ICD-10-CM

## 2024-09-24 DIAGNOSIS — Z90.710 S/P LAPAROSCOPIC HYSTERECTOMY: Primary | ICD-10-CM

## 2024-09-24 LAB
ABO/RH(D): NORMAL
ANTIBODY SCREEN: NEGATIVE
SPECIMEN EXPIRATION DATE: NORMAL

## 2024-09-24 PROCEDURE — 258N000003 HC RX IP 258 OP 636: Performed by: NURSE ANESTHETIST, CERTIFIED REGISTERED

## 2024-09-24 PROCEDURE — 86901 BLOOD TYPING SEROLOGIC RH(D): CPT

## 2024-09-24 PROCEDURE — 250N000009 HC RX 250: Performed by: OBSTETRICS & GYNECOLOGY

## 2024-09-24 PROCEDURE — 88305 TISSUE EXAM BY PATHOLOGIST: CPT | Mod: 26 | Performed by: STUDENT IN AN ORGANIZED HEALTH CARE EDUCATION/TRAINING PROGRAM

## 2024-09-24 PROCEDURE — 250N000011 HC RX IP 250 OP 636: Performed by: NURSE ANESTHETIST, CERTIFIED REGISTERED

## 2024-09-24 PROCEDURE — 88377 M/PHMTRC ALYS ISHQUANT/SEMIQ: CPT | Mod: 26 | Performed by: MEDICAL GENETICS

## 2024-09-24 PROCEDURE — 88307 TISSUE EXAM BY PATHOLOGIST: CPT | Mod: 26 | Performed by: STUDENT IN AN ORGANIZED HEALTH CARE EDUCATION/TRAINING PROGRAM

## 2024-09-24 PROCEDURE — 88377 M/PHMTRC ALYS ISHQUANT/SEMIQ: CPT | Performed by: OBSTETRICS & GYNECOLOGY

## 2024-09-24 PROCEDURE — 272N000001 HC OR GENERAL SUPPLY STERILE: Performed by: OBSTETRICS & GYNECOLOGY

## 2024-09-24 PROCEDURE — 250N000011 HC RX IP 250 OP 636: Performed by: OBSTETRICS & GYNECOLOGY

## 2024-09-24 PROCEDURE — 58571 TLH W/T/O 250 G OR LESS: CPT | Performed by: ANESTHESIOLOGY

## 2024-09-24 PROCEDURE — 36415 COLL VENOUS BLD VENIPUNCTURE: CPT

## 2024-09-24 PROCEDURE — 88309 TISSUE EXAM BY PATHOLOGIST: CPT | Mod: 26 | Performed by: STUDENT IN AN ORGANIZED HEALTH CARE EDUCATION/TRAINING PROGRAM

## 2024-09-24 PROCEDURE — 272N000002 HC OR SUPPLY OTHER OPNP: Performed by: OBSTETRICS & GYNECOLOGY

## 2024-09-24 PROCEDURE — 88311 DECALCIFY TISSUE: CPT | Mod: 26 | Performed by: STUDENT IN AN ORGANIZED HEALTH CARE EDUCATION/TRAINING PROGRAM

## 2024-09-24 PROCEDURE — 250N000011 HC RX IP 250 OP 636: Performed by: ANESTHESIOLOGY

## 2024-09-24 PROCEDURE — 370N000017 HC ANESTHESIA TECHNICAL FEE, PER MIN: Performed by: OBSTETRICS & GYNECOLOGY

## 2024-09-24 PROCEDURE — 710N000010 HC RECOVERY PHASE 1, LEVEL 2, PER MIN: Performed by: OBSTETRICS & GYNECOLOGY

## 2024-09-24 PROCEDURE — 88305 TISSUE EXAM BY PATHOLOGIST: CPT | Mod: TC | Performed by: OBSTETRICS & GYNECOLOGY

## 2024-09-24 PROCEDURE — 250N000013 HC RX MED GY IP 250 OP 250 PS 637

## 2024-09-24 PROCEDURE — 360N000080 HC SURGERY LEVEL 7, PER MIN: Performed by: OBSTETRICS & GYNECOLOGY

## 2024-09-24 PROCEDURE — 86900 BLOOD TYPING SEROLOGIC ABO: CPT

## 2024-09-24 PROCEDURE — 88305 TISSUE EXAM BY PATHOLOGIST: CPT | Mod: 26 | Performed by: PATHOLOGY

## 2024-09-24 PROCEDURE — 250N000025 HC SEVOFLURANE, PER MIN: Performed by: OBSTETRICS & GYNECOLOGY

## 2024-09-24 PROCEDURE — 250N000013 HC RX MED GY IP 250 OP 250 PS 637: Performed by: OBSTETRICS & GYNECOLOGY

## 2024-09-24 PROCEDURE — 250N000009 HC RX 250: Performed by: NURSE ANESTHETIST, CERTIFIED REGISTERED

## 2024-09-24 PROCEDURE — 710N000012 HC RECOVERY PHASE 2, PER MINUTE: Performed by: OBSTETRICS & GYNECOLOGY

## 2024-09-24 PROCEDURE — 58571 TLH W/T/O 250 G OR LESS: CPT | Performed by: NURSE ANESTHETIST, CERTIFIED REGISTERED

## 2024-09-24 PROCEDURE — 999N000141 HC STATISTIC PRE-PROCEDURE NURSING ASSESSMENT: Performed by: OBSTETRICS & GYNECOLOGY

## 2024-09-24 PROCEDURE — 88360 TUMOR IMMUNOHISTOCHEM/MANUAL: CPT | Mod: 26 | Performed by: STUDENT IN AN ORGANIZED HEALTH CARE EDUCATION/TRAINING PROGRAM

## 2024-09-24 RX ORDER — KETAMINE HYDROCHLORIDE 10 MG/ML
INJECTION INTRAMUSCULAR; INTRAVENOUS PRN
Status: DISCONTINUED | OUTPATIENT
Start: 2024-09-24 | End: 2024-09-24

## 2024-09-24 RX ORDER — SODIUM CHLORIDE, SODIUM LACTATE, POTASSIUM CHLORIDE, CALCIUM CHLORIDE 600; 310; 30; 20 MG/100ML; MG/100ML; MG/100ML; MG/100ML
INJECTION, SOLUTION INTRAVENOUS CONTINUOUS PRN
Status: DISCONTINUED | OUTPATIENT
Start: 2024-09-24 | End: 2024-09-24

## 2024-09-24 RX ORDER — METRONIDAZOLE 500 MG/100ML
500 INJECTION, SOLUTION INTRAVENOUS
Status: COMPLETED | OUTPATIENT
Start: 2024-09-24 | End: 2024-09-24

## 2024-09-24 RX ORDER — ACETAMINOPHEN 325 MG/1
975 TABLET ORAL ONCE
Status: COMPLETED | OUTPATIENT
Start: 2024-09-24 | End: 2024-09-24

## 2024-09-24 RX ORDER — ONDANSETRON 2 MG/ML
4 INJECTION INTRAMUSCULAR; INTRAVENOUS EVERY 30 MIN PRN
Status: DISCONTINUED | OUTPATIENT
Start: 2024-09-24 | End: 2024-09-24 | Stop reason: HOSPADM

## 2024-09-24 RX ORDER — HEPARIN SODIUM 5000 [USP'U]/.5ML
5000 INJECTION, SOLUTION INTRAVENOUS; SUBCUTANEOUS
Status: COMPLETED | OUTPATIENT
Start: 2024-09-24 | End: 2024-09-24

## 2024-09-24 RX ORDER — SODIUM CHLORIDE, SODIUM LACTATE, POTASSIUM CHLORIDE, CALCIUM CHLORIDE 600; 310; 30; 20 MG/100ML; MG/100ML; MG/100ML; MG/100ML
INJECTION, SOLUTION INTRAVENOUS CONTINUOUS
Status: DISCONTINUED | OUTPATIENT
Start: 2024-09-24 | End: 2024-09-24 | Stop reason: HOSPADM

## 2024-09-24 RX ORDER — AMOXICILLIN 250 MG
1-2 CAPSULE ORAL 2 TIMES DAILY PRN
Qty: 30 TABLET | Refills: 0 | Status: SHIPPED | OUTPATIENT
Start: 2024-09-24

## 2024-09-24 RX ORDER — ACETAMINOPHEN 325 MG/1
975 TABLET ORAL EVERY 6 HOURS PRN
Qty: 50 TABLET | Refills: 0 | Status: SHIPPED | OUTPATIENT
Start: 2024-09-24 | End: 2024-09-24

## 2024-09-24 RX ORDER — CEFAZOLIN SODIUM/WATER 2 G/20 ML
2 SYRINGE (ML) INTRAVENOUS
Status: COMPLETED | OUTPATIENT
Start: 2024-09-24 | End: 2024-09-24

## 2024-09-24 RX ORDER — LIDOCAINE HYDROCHLORIDE 20 MG/ML
INJECTION, SOLUTION INFILTRATION; PERINEURAL PRN
Status: DISCONTINUED | OUTPATIENT
Start: 2024-09-24 | End: 2024-09-24

## 2024-09-24 RX ORDER — HYDROMORPHONE HCL IN WATER/PF 6 MG/30 ML
0.2 PATIENT CONTROLLED ANALGESIA SYRINGE INTRAVENOUS EVERY 5 MIN PRN
Status: DISCONTINUED | OUTPATIENT
Start: 2024-09-24 | End: 2024-09-24 | Stop reason: HOSPADM

## 2024-09-24 RX ORDER — DEXAMETHASONE SODIUM PHOSPHATE 4 MG/ML
INJECTION, SOLUTION INTRA-ARTICULAR; INTRALESIONAL; INTRAMUSCULAR; INTRAVENOUS; SOFT TISSUE PRN
Status: DISCONTINUED | OUTPATIENT
Start: 2024-09-24 | End: 2024-09-24

## 2024-09-24 RX ORDER — PROPOFOL 10 MG/ML
INJECTION, EMULSION INTRAVENOUS PRN
Status: DISCONTINUED | OUTPATIENT
Start: 2024-09-24 | End: 2024-09-24

## 2024-09-24 RX ORDER — OXYCODONE HYDROCHLORIDE 5 MG/1
5 TABLET ORAL
Status: DISCONTINUED | OUTPATIENT
Start: 2024-09-24 | End: 2024-09-24 | Stop reason: HOSPADM

## 2024-09-24 RX ORDER — OXYCODONE HYDROCHLORIDE 5 MG/1
5 TABLET ORAL EVERY 6 HOURS PRN
Qty: 6 TABLET | Refills: 0 | Status: SHIPPED | OUTPATIENT
Start: 2024-09-24 | End: 2024-09-27

## 2024-09-24 RX ORDER — AMOXICILLIN 250 MG
1-2 CAPSULE ORAL 2 TIMES DAILY PRN
Qty: 30 TABLET | Refills: 0 | Status: SHIPPED | OUTPATIENT
Start: 2024-09-24 | End: 2024-09-24

## 2024-09-24 RX ORDER — IBUPROFEN 200 MG
800 TABLET ORAL ONCE
Status: DISCONTINUED | OUTPATIENT
Start: 2024-09-24 | End: 2024-09-24 | Stop reason: HOSPADM

## 2024-09-24 RX ORDER — ONDANSETRON 4 MG/1
4 TABLET, ORALLY DISINTEGRATING ORAL EVERY 30 MIN PRN
Status: DISCONTINUED | OUTPATIENT
Start: 2024-09-24 | End: 2024-09-24 | Stop reason: HOSPADM

## 2024-09-24 RX ORDER — OXYCODONE HYDROCHLORIDE 5 MG/1
5 TABLET ORAL
Status: COMPLETED | OUTPATIENT
Start: 2024-09-24 | End: 2024-09-24

## 2024-09-24 RX ORDER — FENTANYL CITRATE 50 UG/ML
INJECTION, SOLUTION INTRAMUSCULAR; INTRAVENOUS PRN
Status: DISCONTINUED | OUTPATIENT
Start: 2024-09-24 | End: 2024-09-24

## 2024-09-24 RX ORDER — NALOXONE HYDROCHLORIDE 0.4 MG/ML
0.1 INJECTION, SOLUTION INTRAMUSCULAR; INTRAVENOUS; SUBCUTANEOUS
Status: DISCONTINUED | OUTPATIENT
Start: 2024-09-24 | End: 2024-09-24 | Stop reason: HOSPADM

## 2024-09-24 RX ORDER — IBUPROFEN 800 MG/1
800 TABLET, FILM COATED ORAL EVERY 6 HOURS PRN
Qty: 30 TABLET | Refills: 0 | Status: SHIPPED | OUTPATIENT
Start: 2024-09-24 | End: 2024-09-24

## 2024-09-24 RX ORDER — ONDANSETRON 2 MG/ML
INJECTION INTRAMUSCULAR; INTRAVENOUS PRN
Status: DISCONTINUED | OUTPATIENT
Start: 2024-09-24 | End: 2024-09-24

## 2024-09-24 RX ORDER — INDOCYANINE GREEN AND WATER 25 MG
KIT INJECTION PRN
Status: DISCONTINUED | OUTPATIENT
Start: 2024-09-24 | End: 2024-09-24 | Stop reason: HOSPADM

## 2024-09-24 RX ORDER — IBUPROFEN 800 MG/1
800 TABLET, FILM COATED ORAL EVERY 6 HOURS PRN
Qty: 30 TABLET | Refills: 0 | Status: SHIPPED | OUTPATIENT
Start: 2024-09-24

## 2024-09-24 RX ORDER — FENTANYL CITRATE 50 UG/ML
25 INJECTION, SOLUTION INTRAMUSCULAR; INTRAVENOUS EVERY 5 MIN PRN
Status: DISCONTINUED | OUTPATIENT
Start: 2024-09-24 | End: 2024-09-24 | Stop reason: HOSPADM

## 2024-09-24 RX ORDER — PHENAZOPYRIDINE HYDROCHLORIDE 200 MG/1
200 TABLET, FILM COATED ORAL ONCE
Status: COMPLETED | OUTPATIENT
Start: 2024-09-24 | End: 2024-09-24

## 2024-09-24 RX ORDER — PROPOFOL 10 MG/ML
INJECTION, EMULSION INTRAVENOUS CONTINUOUS PRN
Status: DISCONTINUED | OUTPATIENT
Start: 2024-09-24 | End: 2024-09-24

## 2024-09-24 RX ORDER — CEFAZOLIN SODIUM/WATER 2 G/20 ML
2 SYRINGE (ML) INTRAVENOUS SEE ADMIN INSTRUCTIONS
Status: DISCONTINUED | OUTPATIENT
Start: 2024-09-24 | End: 2024-09-24 | Stop reason: HOSPADM

## 2024-09-24 RX ORDER — ACETAMINOPHEN 325 MG/1
975 TABLET ORAL EVERY 6 HOURS PRN
Qty: 50 TABLET | Refills: 0 | Status: SHIPPED | OUTPATIENT
Start: 2024-09-24

## 2024-09-24 RX ADMIN — METRONIDAZOLE 500 MG: 500 INJECTION, SOLUTION INTRAVENOUS at 07:04

## 2024-09-24 RX ADMIN — DEXAMETHASONE SODIUM PHOSPHATE 8 MG: 4 INJECTION, SOLUTION INTRA-ARTICULAR; INTRALESIONAL; INTRAMUSCULAR; INTRAVENOUS; SOFT TISSUE at 08:05

## 2024-09-24 RX ADMIN — FENTANYL CITRATE 25 MCG: 50 INJECTION, SOLUTION INTRAMUSCULAR; INTRAVENOUS at 10:48

## 2024-09-24 RX ADMIN — HYDROMORPHONE HYDROCHLORIDE 0.5 MG: 1 INJECTION, SOLUTION INTRAMUSCULAR; INTRAVENOUS; SUBCUTANEOUS at 10:06

## 2024-09-24 RX ADMIN — ACETAMINOPHEN 975 MG: 325 TABLET ORAL at 11:50

## 2024-09-24 RX ADMIN — HYDROMORPHONE HYDROCHLORIDE 0.2 MG: 0.2 INJECTION, SOLUTION INTRAMUSCULAR; INTRAVENOUS; SUBCUTANEOUS at 10:58

## 2024-09-24 RX ADMIN — HYDROMORPHONE HYDROCHLORIDE 0.2 MG: 0.2 INJECTION, SOLUTION INTRAMUSCULAR; INTRAVENOUS; SUBCUTANEOUS at 11:18

## 2024-09-24 RX ADMIN — HEPARIN SODIUM 5000 UNITS: 5000 INJECTION, SOLUTION INTRAVENOUS; SUBCUTANEOUS at 07:00

## 2024-09-24 RX ADMIN — Medication 30 MG: at 08:05

## 2024-09-24 RX ADMIN — PHENAZOPYRIDINE 200 MG: 200 TABLET ORAL at 07:00

## 2024-09-24 RX ADMIN — PROPOFOL 100 MG: 10 INJECTION, EMULSION INTRAVENOUS at 08:05

## 2024-09-24 RX ADMIN — FENTANYL CITRATE 25 MCG: 50 INJECTION, SOLUTION INTRAMUSCULAR; INTRAVENOUS at 10:37

## 2024-09-24 RX ADMIN — LIDOCAINE HYDROCHLORIDE 100 MG: 20 INJECTION, SOLUTION INFILTRATION; PERINEURAL at 08:05

## 2024-09-24 RX ADMIN — SODIUM CHLORIDE, POTASSIUM CHLORIDE, SODIUM LACTATE AND CALCIUM CHLORIDE: 600; 310; 30; 20 INJECTION, SOLUTION INTRAVENOUS at 07:56

## 2024-09-24 RX ADMIN — OXYCODONE HYDROCHLORIDE 5 MG: 5 TABLET ORAL at 11:46

## 2024-09-24 RX ADMIN — FENTANYL CITRATE 50 MCG: 50 INJECTION INTRAMUSCULAR; INTRAVENOUS at 08:34

## 2024-09-24 RX ADMIN — ONDANSETRON 4 MG: 2 INJECTION INTRAMUSCULAR; INTRAVENOUS at 09:58

## 2024-09-24 RX ADMIN — SODIUM CHLORIDE, POTASSIUM CHLORIDE, SODIUM LACTATE AND CALCIUM CHLORIDE: 600; 310; 30; 20 INJECTION, SOLUTION INTRAVENOUS at 09:01

## 2024-09-24 RX ADMIN — PROPOFOL 125 MCG/KG/MIN: 10 INJECTION, EMULSION INTRAVENOUS at 08:18

## 2024-09-24 RX ADMIN — Medication 20 MG: at 08:35

## 2024-09-24 RX ADMIN — ONDANSETRON 4 MG: 2 INJECTION INTRAMUSCULAR; INTRAVENOUS at 12:41

## 2024-09-24 RX ADMIN — Medication 20 MG: at 09:16

## 2024-09-24 RX ADMIN — Medication 2 G: at 08:08

## 2024-09-24 RX ADMIN — Medication 200 MG: at 10:06

## 2024-09-24 RX ADMIN — MIDAZOLAM 2 MG: 1 INJECTION INTRAMUSCULAR; INTRAVENOUS at 07:56

## 2024-09-24 RX ADMIN — HYDROMORPHONE HYDROCHLORIDE 0.2 MG: 0.2 INJECTION, SOLUTION INTRAMUSCULAR; INTRAVENOUS; SUBCUTANEOUS at 11:32

## 2024-09-24 RX ADMIN — Medication 50 MG: at 08:05

## 2024-09-24 ASSESSMENT — ACTIVITIES OF DAILY LIVING (ADL)
ADLS_ACUITY_SCORE: 31
ADLS_ACUITY_SCORE: 32
ADLS_ACUITY_SCORE: 32
ADLS_ACUITY_SCORE: 31
ADLS_ACUITY_SCORE: 31
ADLS_ACUITY_SCORE: 32
ADLS_ACUITY_SCORE: 31
ADLS_ACUITY_SCORE: 31

## 2024-09-24 NOTE — OR NURSING
Discharge instructions printed and reviewed with patient and her .  All questions answered at this time.  Discharge prescriptions for Senna, Oxycodone, Ibuprofen and Tylenol were given to the patient at discharge. All belongings returned to patient at this time.

## 2024-09-24 NOTE — ANESTHESIA CARE TRANSFER NOTE
Patient: Estelita Waters    Procedure: Procedure(s):  HYSTERECTOMY, TOTAL, ROBOT-ASSISTED, WITH BILATERAL SALPINGO-OOPHORECTOMY, AND BILATERAL PELVIC sentinel sampling, omentectomy       Diagnosis: Endometrial cancer [C54.1]  Diagnosis Additional Information: No value filed.    Anesthesia Type:   General     Note:    Oropharynx: oropharynx clear of all foreign objects and spontaneously breathing  Level of Consciousness: awake  Oxygen Supplementation: nasal cannula  Level of Supplemental Oxygen (L/min / FiO2): 2  Independent Airway: airway patency satisfactory and stable  Dentition: dentition unchanged  Vital Signs Stable: post-procedure vital signs reviewed and stable  Report to RN Given: handoff report given  Patient transferred to: PACU  Comments: Awake, tolerated well  Handoff Report: Identifed the Patient, Identified the Reponsible Provider, Reviewed the pertinent medical history, Discussed the surgical course, Reviewed Intra-OP anesthesia mangement and issues during anesthesia, Set expectations for post-procedure period and Allowed opportunity for questions and acknowledgement of understanding      Vitals:  Vitals Value Taken Time   /76 09/24/24 1045   Temp 36.6  C (97.8  F) 09/24/24 1030   Pulse 54 09/24/24 1045   Resp 13 09/24/24 1045   SpO2 100 % 09/24/24 1045   Vitals shown include unfiled device data.    Electronically Signed By: VANESA Streeter CRNA  September 24, 2024  10:46 AM

## 2024-09-24 NOTE — ANESTHESIA POSTPROCEDURE EVALUATION
Patient: Estelita Waters    Procedure: Procedure(s):  HYSTERECTOMY, TOTAL, ROBOT-ASSISTED, WITH BILATERAL SALPINGO-OOPHORECTOMY, AND BILATERAL PELVIC sentinel sampling, omentectomy       Anesthesia Type:  General    Note:  Disposition: Outpatient   Postop Pain Control: Uneventful            Sign Out: Well controlled pain   PONV: No   Neuro/Psych: Uneventful            Sign Out: Acceptable/Baseline neuro status   Airway/Respiratory: Uneventful            Sign Out: Acceptable/Baseline resp. status   CV/Hemodynamics: Uneventful            Sign Out: Acceptable CV status; No obvious hypovolemia; No obvious fluid overload   Other NRE: NONE   DID A NON-ROUTINE EVENT OCCUR? No           Last vitals:  Vitals Value Taken Time   /76 09/24/24 1045   Temp 36.6  C (97.8  F) 09/24/24 1030   Pulse 49 09/24/24 1054   Resp 13 09/24/24 1054   SpO2 100 % 09/24/24 1054   Vitals shown include unfiled device data.    Electronically Signed By: Candy Scott MD  September 24, 2024  10:54 AM

## 2024-09-24 NOTE — BRIEF OP NOTE
[facility]    Brief Operative Note    Pre-operative diagnosis: Endometrial cancer [C54.1]  Post-operative diagnosis Same as pre-operative diagnosis    Procedure: HYSTERECTOMY, TOTAL, ROBOT-ASSISTED, WITH BILATERAL SALPINGO-OOPHORECTOMY, AND BILATERAL PELVIC sentinel sampling, omentectomy, Bilateral - Abdomen    Surgeon: Surgeons and Role:     * Wenceslao Burt MD - Primary     * Pete Vera MD - Resident - Assisting     * Keaton Jaime MD - Fellow - Assisting  Anesthesia: General   Estimated Blood Loss: 50 ml    Drains: None  Specimens:   ID Type Source Tests Collected by Time Destination   1 : Right Para Aortic Lymph node Tissue Lymph Node(s), Para-Aortic SURGICAL PATHOLOGY EXAM Wenceslao Burt MD 9/24/2024  9:05 AM    2 : Pelvic Washing Washings Pelvis NON-GYNECOLOGIC CYTOLOGY Wenceslao Burt MD 9/24/2024  9:08 AM    3 : Left External Iliac sentinal lymph node Tissue Lymph Node(s), Iliac, Left SURGICAL PATHOLOGY EXAM Wenceslao Burt MD 9/24/2024  9:11 AM    4 : UTERUS, CERVIX, BILATERAL FALLOPIAN TUBES AND OVARIES Tissue Uterus, Cervix, Bilateral Fallopian Tubes & Ovaries SURGICAL PATHOLOGY EXAM Wenceslao Burt MD 9/24/2024  9:30 AM    5 : OMENTUM Tissue Omentum SURGICAL PATHOLOGY EXAM Wenceslao Burt MD 9/24/2024  9:35 AM      Findings:   EUA revealed bulky, anteverted mobile uterus, no adnexal masses palpated. Per laparoscopy, normal appearing uterus, bilateral fallopian tubes and ovaries.  No adhesive disease noted.  Laparoscopic abdominal survey revealed normal appearing omentum, liver, gallbladder, and bowel.  No evidence of metastatic disease throughout the abdomen and pelvis. Hemostatic surgical sites at the end of the case. Speculum exam without evidence of vaginal lacerations and cuff well approximated.  Complications: None.  Implants: * No implants in log *    Pete Vera MD, MPH  Ob/Gyn Resident, PGY-4  09/24/24 10:14 AM 
Clear bilaterally, pupils equal, round and reactive to light.

## 2024-09-24 NOTE — ANESTHESIA PREPROCEDURE EVALUATION
Anesthesia Pre-Procedure Evaluation    Patient: Estelita Waters   MRN: [id number] : 1960        Procedure : Procedure(s):  HYSTERECTOMY, TOTAL, ROBOT-ASSISTED, WITH BILATERAL SALPINGO-OOPHORECTOMY, AND BILATERAL PELVIC sentinel LYMPHADENECTOMY, omentectomy, possible open          Past Medical History:   Diagnosis Date    History of melanoma     Uterine cancer (H)       Past Surgical History:   Procedure Laterality Date     SECTION      MOHS MICROGRAPHIC PROCEDURE      NE BREAST AUGMENTATION      WRIST SURGERY        No Known Allergies   Social History     Tobacco Use    Smoking status: Never    Smokeless tobacco: Never   Substance Use Topics    Alcohol use: Yes     Alcohol/week: 4.0 standard drinks of alcohol     Types: 4 Glasses of wine per week      Wt Readings from Last 1 Encounters:   24 62.8 kg (138 lb 7.2 oz)        Anesthesia Evaluation   Pt has had prior anesthetic. Type: General and MAC.    No history of anesthetic complications       ROS/MED HX  ENT/Pulmonary:     (+)                              recent URI, resolved, covid in July:        Neurologic:  - neg neurologic ROS     Cardiovascular:     (+)  - -   -  - -                                 No previous cardiac testing  (-) PHELPS   METS/Exercise Tolerance: >4 METS Comment: Runs, mountain bikes, hikes, walks and plays pickleball.  Also does some weight lifting.    Hematologic:  - neg hematologic  ROS     Musculoskeletal:  - neg musculoskeletal ROS     GI/Hepatic:  - neg GI/hepatic ROS     Renal/Genitourinary: Comment: Endometrial cancer      Endo:  - neg endo ROS     Psychiatric/Substance Use:  - neg psychiatric ROS     Infectious Disease:  - neg infectious disease ROS     Malignancy:   (+) Malignancy, History of Skin and Other.Skin CA Remission status post.  Other CA endometrial cancer Remission status post.    Other:  - neg other ROS          Physical Exam    Airway        Mallampati: II   TM distance: > 3 FB   Neck ROM: full  "  Mouth opening: > 3 cm    Respiratory Devices and Support         Dental           Cardiovascular   cardiovascular exam normal          Pulmonary   pulmonary exam normal                OUTSIDE LABS:  CBC: No results found for: \"WBC\", \"HGB\", \"HCT\", \"PLT\"  BMP:   Lab Results   Component Value Date     09/06/2024    POTASSIUM 5.3 09/06/2024    CHLORIDE 105 09/06/2024    CO2 27 09/06/2024    BUN 16.1 09/06/2024    CR 0.68 09/06/2024     (H) 09/06/2024     COAGS: No results found for: \"PTT\", \"INR\", \"FIBR\"  POC: No results found for: \"BGM\", \"HCG\", \"HCGS\"  HEPATIC: No results found for: \"ALBUMIN\", \"PROTTOTAL\", \"ALT\", \"AST\", \"GGT\", \"ALKPHOS\", \"BILITOTAL\", \"BILIDIRECT\", \"CASSI\"  OTHER:   Lab Results   Component Value Date    REBECCA 10.4 09/06/2024       Anesthesia Plan    ASA Status:  2       Anesthesia Type: General.     - Airway: ETT   Induction: Intravenous, Propofol.   Maintenance: Balanced.   Techniques and Equipment:     - Airway: Video-Laryngoscope     - Lines/Monitors: 2nd IV     Consents    Anesthesia Plan(s) and associated risks, benefits, and realistic alternatives discussed. Questions answered and patient/representative(s) expressed understanding.     - Discussed: Risks, Benefits and Alternatives for the PROCEDURE were discussed     - Discussed with:  Patient      - Extended Intubation/Ventilatory Support Discussed: Yes.      - Patient is DNR/DNI Status: No     Use of blood products discussed: Yes.     - Discussed with: Patient.     - Consented: consented to blood products     Postoperative Care    Pain management: IV analgesics, Oral pain medications, Multi-modal analgesia.   PONV prophylaxis: Ondansetron (or other 5HT-3), Dexamethasone or Solumedrol, Background Propofol Infusion     Comments:               Candy Scott MD    I have reviewed the pertinent notes and labs in the chart from the past 30 days and (re)examined the patient.  Any updates or changes from those notes are reflected in this " note.

## 2024-09-24 NOTE — OP NOTE
[facility]  Gynecologic Oncology Operative Note    Procedure Date: 9/24/2024      PREOPERATIVE DIAGNOSES:  High grade serous uterine carcinoma     POSTOPERATIVE DIAGNOSES:  Same as above, s/p procedures below     PROCEDURES PERFORMED:    HYSTERECTOMY, TOTAL, ROBOT-ASSISTED, WITH BILATERAL SALPINGO-OOPHORECTOMY, AND BILATERAL PELVIC sentinel sampling, omentectomy     SURGEON:  Wenceslao Burt MD     ASSISTANTS:    1.  Keaton Jaime MD PGY5  2.  Pete Vera MD PGY4      ESTIMATED BLOOD LOSS:  50 mL     COMPLICATIONS:  None.     SPECIMENS REMOVED:    ID Type Source Tests Collected by Time Destination   1 : Right Para Aortic Lymph node Tissue Lymph Node(s), Para-Aortic SURGICAL PATHOLOGY EXAM Wenceslao Burt MD 9/24/2024  9:05 AM    2 : Pelvic Washing Washings Pelvis NON-GYNECOLOGIC CYTOLOGY Wenceslao Burt MD 9/24/2024  9:08 AM    3 : Left External Iliac sentinal lymph node Tissue Lymph Node(s), Iliac, Left SURGICAL PATHOLOGY EXAM Wenceslao Burt MD 9/24/2024  9:11 AM    4 : UTERUS, CERVIX, BILATERAL FALLOPIAN TUBES AND OVARIES Tissue Uterus, Cervix, Bilateral Fallopian Tubes & Ovaries SURGICAL PATHOLOGY EXAM Wenceslao Burt MD 9/24/2024  9:30 AM    5 : OMENTUM Tissue Omentum SURGICAL PATHOLOGY EXAM Wenceslao Burt MD 9/24/2024  9:35 AM       INDICATIONS FOR PROCEDURE:  The patient is a 64 year-old patient with high grade serous uterine carcinoma. Management options were discussed and the above procedures were recommended. The risks, benefits and alternatives were discussed. The patient agreed to proceed. Written consent was obtained.     FINDINGS:  EUA revealed bulky, anteverted mobile uterus, no adnexal masses palpated. Per laparoscopy, normal appearing uterus, bilateral fallopian tubes and ovaries.  No adhesive disease noted.  Laparoscopic abdominal survey revealed normal appearing omentum, liver, gallbladder, and bowel.  No evidence of metastatic disease throughout the  abdomen and pelvis. Hemostatic surgical sites at the end of the case. Speculum exam without evidence of vaginal lacerations and cuff well approximated.      DESCRIPTION OF PROCEDURE:  After informed consent, the patient was brought to the operating room where general anesthesia was administered. She was given Ancef and Flagyl for preoperative prophylactic antibiotics.  SCDs were placed on her lower extremities. She was prepped and draped in the dorsal lithotomy position.  A Ty catheter was placed in her bladder.       A speculum was placed in the vagina. The cervix was grasped with a single tooth tenaculum.  The cervix was injected with 4 mL of ICG dye in preparation for sentinel lymph node mapping. The uterine manipulator was then placed.      Attention was turned to the abdomen.  We placed a Veress needle through the umbilicus.  Opening pressure was 1 mmHg.  Pneumoperitoneum was established. A supraumbilical incision was then made using the scalpel and a robotic trocar was inserted without complication.  Under direct visualization, 2 left-sided robotic trocars, 1 right-sided robotic trocar and 1 right-sided 8 mm accessory trocar were placed.  The abdomen and pelvis were inspected with the above findings noted. The patient was placed in steep Trendelenburg.  The robot was docked, and the instruments were placed in the patient's pelvis. Pelvic washings were obtained.     The right retroperitoneal space was opened through the right round ligament.  We identified the right ureter in the right external iliac artery and vein. The right sentinel lymph node was identified at the level of the aorta. This was excised without complication.      In a similar fashion, the left retroperitoneal space was opened.   The left ureter was identified.  The left external iliac artery and vein were identified. The left sentinel lymph node was identified overlying the external iliac artery. This was excised without difficulty.       Both ovarian vascular pedicles were skeletonized, coagulated and cut.  The anterior peritoneum was transected.  A bladder flap was created by gently dissecting the bladder off the anterior cervix and vagina.  The uterine arteries were skeletonized, coagulated and cut.  The colpotomy was then performed circumferentially, thus  the specimen from the upper vagina.  The specimen was easily removed through the vagina.     Omental biopsy was then obtained without complication.      The pelvis was irrigated. Hemostasis was ensured. The vaginal cuff was then closed with a series of simple interrupted suture using 2-0 Vicryl.    The instruments were removed.  The robot was undocked.  The pneumoperitoneum was evacuated.  The trocars were removed.  The port sites were closed with 4-0 Monocryl in a subcuticular fashion, and Steri-Strips were placed over the incisions.  The vagina was inspected and hemostatic.  The patient was awoken from anesthesia and brought to the recovery room in stable condition.      Wenceslao Burt MD, MS    Department of Obstetrics and Gynecology   Division of Gynecologic Oncology   [facility]  Phone: [phone]

## 2024-09-24 NOTE — ANESTHESIA PROCEDURE NOTES
Airway       Patient location during procedure: OR       Procedure Start/Stop Times: 9/24/2024 8:09 AM  Staff -        CRNA: Jake Davenport APRN CRNA       Performed By: CRNA  Consent for Airway        Urgency: elective  Indications and Patient Condition       Indications for airway management: migel-procedural       Induction type:intravenous       Mask difficulty assessment: 1 - vent by mask    Final Airway Details       Final airway type: endotracheal airway       Successful airway: ETT - single  Endotracheal Airway Details        ETT size (mm): 7.0       Cuffed: yes       Successful intubation technique: direct laryngoscopy       DL Blade Type: Tidwell 2       Grade View of Cords: 1       Position: Right       Measured from: gums/teeth       Secured at (cm): 21       Bite block used: None    Post intubation assessment        Placement verified by: capnometry and equal breath sounds        Number of attempts at approach: 1       Number of other approaches attempted: 0       Secured with: plastic tape       Ease of procedure: easy       Dentition: Intact and Unchanged    Medication(s) Administered   Medication Administration Time: 9/24/2024 8:09 AM

## 2024-09-24 NOTE — DISCHARGE INSTRUCTIONS
Contacting your Doctor -   To contact a doctor, call Dr Burt at the Gynecology/Oncology clinic at [phone]  or:  [phone] and ask for the resident on call for Gynecology (answered 24 hours a day)   Emergency Department:  [facility]: [phone] 911 if you are in need of immediate or emergent help

## 2024-09-25 LAB
PATH REPORT.COMMENTS IMP SPEC: NORMAL
PATH REPORT.COMMENTS IMP SPEC: NORMAL
PATH REPORT.FINAL DX SPEC: NORMAL
PATH REPORT.GROSS SPEC: NORMAL
PATH REPORT.MICROSCOPIC SPEC OTHER STN: NORMAL
PATH REPORT.RELEVANT HX SPEC: NORMAL

## 2024-10-02 LAB
PATH REPORT.COMMENTS IMP SPEC: ABNORMAL
PATH REPORT.COMMENTS IMP SPEC: YES
PATH REPORT.FINAL DX SPEC: ABNORMAL
PATH REPORT.GROSS SPEC: ABNORMAL
PATH REPORT.MICROSCOPIC SPEC OTHER STN: ABNORMAL
PATH REPORT.RELEVANT HX SPEC: ABNORMAL
PATHOLOGY SYNOPTIC REPORT: ABNORMAL
PHOTO IMAGE: ABNORMAL

## 2024-10-05 LAB — INTERPRETATION: NORMAL

## 2024-10-09 ENCOUNTER — ONCOLOGY VISIT (OUTPATIENT)
Dept: ONCOLOGY | Facility: HOSPITAL | Age: 64
End: 2024-10-09
Attending: OBSTETRICS & GYNECOLOGY
Payer: COMMERCIAL

## 2024-10-09 VITALS
BODY MASS INDEX: 21.3 KG/M2 | TEMPERATURE: 98.5 F | OXYGEN SATURATION: 100 % | HEART RATE: 57 BPM | SYSTOLIC BLOOD PRESSURE: 151 MMHG | WEIGHT: 136 LBS | DIASTOLIC BLOOD PRESSURE: 78 MMHG | RESPIRATION RATE: 16 BRPM

## 2024-10-09 DIAGNOSIS — C54.1 ENDOMETRIAL CANCER (H): Primary | ICD-10-CM

## 2024-10-09 PROCEDURE — 99213 OFFICE O/P EST LOW 20 MIN: CPT | Mod: 24 | Performed by: OBSTETRICS & GYNECOLOGY

## 2024-10-09 PROCEDURE — 99214 OFFICE O/P EST MOD 30 MIN: CPT | Performed by: OBSTETRICS & GYNECOLOGY

## 2024-10-09 ASSESSMENT — PAIN SCALES - GENERAL: PAINLEVEL: NO PAIN (0)

## 2024-10-09 NOTE — NURSING NOTE
"Oncology Rooming Note    October 9, 2024 12:37 PM   Estelita Waters is a 64 year old female who presents for:    Chief Complaint   Patient presents with    Oncology Clinic Visit     Gyn Onc follow up     Initial Vitals: BP (!) 151/78   Pulse 57   Temp 98.5  F (36.9  C)   Resp 16   Wt 61.7 kg (136 lb)   LMP  (LMP Unknown)   SpO2 100%   BMI 21.30 kg/m   Estimated body mass index is 21.3 kg/m  as calculated from the following:    Height as of 9/24/24: 1.702 m (5' 7\").    Weight as of this encounter: 61.7 kg (136 lb). Body surface area is 1.71 meters squared.  No Pain (0) Comment: Data Unavailable   No LMP recorded (lmp unknown). Patient is postmenopausal.  Allergies reviewed: Yes  Medications reviewed: Yes    Medications: Medication refills not needed today.  Pharmacy name entered into [facility]: [id number] [id number] [address] [place] [id number]    Frailty Screening:   Is the patient here for a new oncology consult visit in cancer care? 2. No      Clinical concerns: Doing well post-op.   Dr. Burt was NOT notified.      Asia Dela Cruz RN              "

## 2024-10-09 NOTE — LETTER
10/9/2024      Estelita Waetrs  [address] [place] [address]      Dear Colleague,    Thank you for referring your patient, Estelita Waters, to the [facility]. Please see a copy of my visit note below.                Follow Up Notes on Referred Patient    Date: 10/9/2024       Dr. Gee referring provider defined for this encounter.       RE: Estelita Waters  : 1960  KAYLEIGH: 10/9/2024    Dear Dr. Gee ref. 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 She has been doing well since the surgery, no nausea, vomiting, fever, or chills. Normal urinary and bowel function, no vaginal bleeding or discharge.    Past Medical History:    Past Medical History:   Diagnosis Date     History of melanoma      Uterine cancer (H)          Past Surgical History:    Past Surgical History:   Procedure Laterality Date      SECTION       DAVINCI HYSTERECTOMY TOTAL, BILATERAL SALPINGO-OOPHORECTOMY, NODE DISSECTION, COMBINED Bilateral 2024    Procedure: HYSTERECTOMY, TOTAL, ROBOT-ASSISTED, WITH BILATERAL SALPINGO-OOPHORECTOMY, AND BILATERAL PELVIC sentinel sampling, omentectomy;  Surgeon: Wenceslao Burt MD;  Location: [facility] OR     HYSTERECTOMY, TOTAL, ROBOT-ASSISTED, WITH BILATERAL SALPINGO-OOPHORECTOMY, AND BILATERAL PELVIC sentinel sampling, omentectomy - Bilateral  2024     MOHS MICROGRAPHIC PROCEDURE       KS BREAST AUGMENTATION       WRIST SURGERY           Health Maintenance Due   Topic Date Due     ADVANCE CARE PLANNING  Never done     Pneumococcal Vaccine: Pediatrics (0 to 5 Years) and At-Risk Patients (6 to 64 Years) (1 of 2 - PCV) Never done     HIV SCREENING  Never done     HEPATITIS C SCREENING  Never done     PAP  Never done     LIPID  Never done     RSV VACCINE (1 - Risk 60-74 years 1-dose series) Never done     COVID-19 Vaccine (2024- season) 2024       Current Medications:      Current Outpatient Medications   Medication Sig Dispense Refill     dexAMETHasone (DECADRON) 4 MG tablet Take 2 tablets (8 mg) by mouth daily. Take for 3 days, starting the day after chemo. Take with food. 6 tablet 5     ondansetron (ZOFRAN) 8 MG tablet Take 1 tablet (8 mg) by mouth every 8 hours as needed for nausea (vomiting). Do not take for 3 days after chemo. 30 tablet 2     prochlorperazine (COMPAZINE) 10 MG tablet Take 1 tablet (10 mg) by mouth every 6 hours as needed for nausea or vomiting. 30 tablet 2     tretinoin (RETIN-A) 0.1 % external cream Apply 1 Application topically as needed.           Allergies:      No Known Allergies     Social History:     Social History     Tobacco Use     Smoking status: Never     Smokeless tobacco: Never   Substance Use Topics     Alcohol use: Yes     Alcohol/week: 4.0 standard drinks of alcohol     Types: 4 Glasses of wine per week       History   Drug Use Unknown         Family History:         Family History   Problem Relation Age of Onset     Pulmonary Embolism Mother         unknown cause     Deep Vein Thrombosis (DVT) Mother      Anesthesia Reaction No family hx of          Physical Exam:     BP (!) 151/78   Pulse 57   Temp 98.5  F (36.9  C)   Resp 16   Wt 61.7 kg (136 lb)   LMP  (LMP Unknown)   SpO2 100%   BMI 21.30 kg/m    Body mass index is 21.3 kg/m .    General Appearance: healthy and alert, no distress     Musculoskeletal: extremities non tender and without edema    Skin: no lesions or rashes     Neurological: normal gait, no gross defects     Psychiatric: appropriate mood and affect                               Hematological: normal cervical, supraclavicular and inguinal lymph nodes     Gastrointestinal:       abdomen soft, non-tender, non-distended, no organomegaly or masses, well healing port site incisions.          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     A total  of 34 minutes was spent with the patient, chart documentation, coordination of care, documentation, in counseling the patient and/or treatment planning.      Stage IB high grade serous endometrial cancer  Family history of early onset cancer  Personal history of Melanoma  HER2 + by fish (2+ IHC)       We discussed in detail the pathophysiology of endometrial cancer as well as molecular classification and treatment. Patient as well as her  agree with this plan they are very appreciative for care all questions were answered.    Wenceslao Burt MD, MS    Department of Obstetrics and Gynecology   Division of Gynecologic Oncology   [facility]  Phone: [phone]  CC  Patient Care Team:  Katie Centeno DO as PCP - General  Wenceslao Burt MD as MD (Gynecologic Oncology)  Shira Edward APRN CNP as Assigned Cancer Care Provider        Again, thank you for allowing me to participate in the care of your patient.        Sincerely,        Wenceslao Burt MD

## 2024-10-09 NOTE — PATIENT INSTRUCTIONS
PLAN:  6 cycles of PACLitaxel / CARBOplatin     I have sent MyChart's with specific information that I hope is helpful

## 2024-10-10 ENCOUNTER — VIRTUAL VISIT (OUTPATIENT)
Dept: ONCOLOGY | Facility: CLINIC | Age: 64
End: 2024-10-10
Attending: NURSE PRACTITIONER
Payer: COMMERCIAL

## 2024-10-10 DIAGNOSIS — C54.1 ENDOMETRIAL CANCER (H): Primary | ICD-10-CM

## 2024-10-10 PROCEDURE — 99215 OFFICE O/P EST HI 40 MIN: CPT | Mod: 95 | Performed by: NURSE PRACTITIONER

## 2024-10-10 PROCEDURE — 99417 PROLNG OP E/M EACH 15 MIN: CPT | Mod: 95 | Performed by: NURSE PRACTITIONER

## 2024-10-10 NOTE — PATIENT INSTRUCTIONS
Your visit today was with VANESA Romero CNP for chemotherapy education.    Plan:  Your proposed regimen: carboplatin and paclitaxel   Schedule: every 21 days x6 cycles  You will have a lab appointment and a provider appointment prior to your infusions- please note these times and arrive at the scheduled appointment times for both lab and your provider appointments 10-15 minutes early if possible.    Your team:  Gynecologic oncologist: Wenceslao Burt MD  RN care coordinator: Shaunna Skelton RNCC; [phone]  Nurse practitioner:Shira Edward CNP  Triage RN line: [place] triage: [phone] and [facility] triage: [phone]    Your take home medications:  -Dexamethasone- This is a steroid and can help prevent nausea. Take 8mg daily for three days in a row, starting the morning after chemotherapy. Take with food and water.  This medication can cause elevated blood sugars, difficulty sleeping, agitation, anxiety, and jitteriness.  If you notice these symptoms you can stop it.  -Prochlorperazine (aka Compazine)- this is an anti-nausea medication that you can take as needed. The dose is one tablet every six hours as needed.  -Ondansetron (aka Zofran)- this is an anti-nausea medication that you can take as needed. The dose is one tablet every eight hours as needed. Wait until 72 hours after chemotherapy to take this medication as you were given a long acting medication in this same drug class.    When to call for help:  A fever of 100.4 F or greater  Shaking chills  Shortness of breath or difficulty breathing  Repeated signs of bleeding, such as nose bleeds that do not stop with pressure, vaginal bleeding that saturates a pad an hour for two hours in a row, easy bruising or black tarry stools  Mouth sores that stop you from eating or cause pain when you swallow  Nausea and vomiting that do not get better with your medications  Diarrhea that does not get better (particularly three or more loose watery  stools in one day)  Extreme weakness  Feeling confused or extremely sleepy  Constipation for more than 48 hours that does not respond to laxatives  New rashes  New swelling in the legs, arms, or face- particularly if one leg is more swollen than the other, hot, red, or painful  Any new symptoms that you did not have before your treatments    WHO to call for help:  Call your RN care coordinator first- if you have difficulty reaching her, then call the triage line. If you have difficulty reaching triage during off hours, you can call [phone] and ask to speak to the gynecologic oncology resident on call.    For urgent questions or concerns, please call rather than send a medical message.

## 2024-10-10 NOTE — LETTER
10/10/2024      Estelita Waters  [address] [place] [address]      Dear Colleague,    Thank you for referring your patient, Estelita Waters, to the [facility]. Please see a copy of my visit note below.    Virtual Visit Details    Type of service:  Video Visit     Originating Location (pt. Location): Home    Distant Location (provider location):  On-site  Platform used for Video Visit: [facility]      Gynecologic Oncology Disease Management Visit Note    Date: Oct 10, 2024    RE: Estelita Waters  : 1960  KAYLEIGH: Oct 10, 2024    CC: Serous endometrial cancer    HPI:  Estelita Waters is a 64 year old woman with a diagnosis of serous endometrial cancer.  She presents for a disease management visit by video.    Oncology History:  Initially, she presented with PMB.    24: Pelvic US  IMPRESSION:   1. Circumscribed hyperechoic mass centered in the endometrium measuring up to   1.9 cm. Differential considerations include a submucosal fibroid, large   endometrial polyp and endometrial malignancy. Recommend tissue sampling for   further evaluation. Female pelvic MRI may provide additional diagnostic   information, could be considered for further evaluation.   2. Fibroid uterus.   3. No suspicious adnexal mass.     24: Diagnostic hysteroscopy, hysteroscopic myomectomy, dilation and curettage    A.  Endometrium, Endometrial curettings and fibroid, curettage:    High-grade carcinoma possibly arising in a polyp, favor serous,  pending POLE mutational status, see comment  Comment:  Stain Results Comments   P53 Negative, P53 ABNORMAL All performed on Block B4   P16 positive     WT1 positive     P53 shows completely absent staining of the epithelial component (abnormal) but wild type staining in the stromal elements (internal control).  MMR intact.   No mutations were detected in POLE.  The measured tumor mutation burden (TMB) is low in this sample.    24:  12.  CT  CAP  IMPRESSION:  1.  No evidence of metastatic disease in the chest, abdomen, or  pelvis.    24: HYSTERECTOMY, TOTAL, ROBOT-ASSISTED, WITH BILATERAL SALPINGO-OOPHORECTOMY, AND BILATERAL PELVIC sentinel sampling, omentectomy   A. Lymph node, RIGHT para-aortic, excision:  - One lymph node, negative for carcinoma (0/1)  B. Lymph node, LEFT external iliac, sentinel, biopsy:  - One lymph node, negative for carcinoma (0/1)  C. Uterus, cervix, bilateral fallopian tubes and ovaries, total laparoscopic hysterectomy with bilateral salpingo-oophorectomy:  - SEROUS CARCINOMA OF THE ENDOMETRIUM (size: 6.1 cm)  - Carcinoma invades 8 mm into a 14 mm myometrium (57%)  - Myometrial leiomyomas (largest: 3.2 cm) with hyalinization and calcification  - Cervix with Nabothian cysts   - Bilateral ovaries with focal surface adhesions  - Bilateral fallopian tubes with paratubal cysts and focal surface adhesions  - See tumor synoptic report below  D. Omentum, omentectomy:  - Fibroadipose tissue, negative for malignancy  Washings negative.  HER2 +2       Plan: Carboplatin AUC 6 and paclitaxel 175 mg/m2 IV every 3 weeks x 6 cycles.              Today she reports;    -Vaginal bleeding/discharge: No  -Abdominal pain: No  -Nausea or vomiting: No  -Appetite: Normal appetite  -Diarrhea/Constipation: Some gas and bloating, probiotics helping  -Neuropathy symptoms: No baseline neuropathy symptoms  -Leg swelling: No   -Fevers: No  -Chest pain: No  -SOB: No  No hx of DM  Prefers no port  Likes running and mountain biking  Normally esposito in [place]            Past Medical History:    Past Medical History:   Diagnosis Date     History of melanoma      Uterine cancer (H)          Past Surgical History:    Past Surgical History:   Procedure Laterality Date      SECTION       DAVINCI HYSTERECTOMY TOTAL, BILATERAL SALPINGO-OOPHORECTOMY, NODE DISSECTION, COMBINED Bilateral 2024    Procedure: HYSTERECTOMY, TOTAL, ROBOT-ASSISTED, WITH BILATERAL  SALPINGO-OOPHORECTOMY, AND BILATERAL PELVIC sentinel sampling, omentectomy;  Surgeon: Wenceslao Burt MD;  Location: [facility] OR     HYSTERECTOMY, TOTAL, ROBOT-ASSISTED, WITH BILATERAL SALPINGO-OOPHORECTOMY, AND BILATERAL PELVIC sentinel sampling, omentectomy - Bilateral  09/24/2024     MOHS MICROGRAPHIC PROCEDURE       AR BREAST AUGMENTATION       WRIST SURGERY           Health Maintenance Due   Topic Date Due     ADVANCE CARE PLANNING  Never done     Pneumococcal Vaccine: Pediatrics (0 to 5 Years) and At-Risk Patients (6 to 64 Years) (1 of 2 - PCV) Never done     HIV SCREENING  Never done     HEPATITIS C SCREENING  Never done     PAP  Never done     LIPID  Never done     RSV VACCINE (1 - Risk 60-74 years 1-dose series) Never done     INFLUENZA VACCINE (1) 09/01/2024     COVID-19 Vaccine (4 - 2024-25 season) 09/01/2024       Current Medications:     Current Outpatient Medications   Medication Sig Dispense Refill     tretinoin (RETIN-A) 0.1 % external cream Apply 1 Application topically as needed.           Allergies:      No Known Allergies     Social History:     Social History     Tobacco Use     Smoking status: Never     Smokeless tobacco: Never   Substance Use Topics     Alcohol use: Yes     Alcohol/week: 4.0 standard drinks of alcohol     Types: 4 Glasses of wine per week       History   Drug Use Unknown         Family History:     The patient's family history is notable for:    Family History   Problem Relation Age of Onset     Pulmonary Embolism Mother         unknown cause     Deep Vein Thrombosis (DVT) Mother      Anesthesia Reaction No family hx of          Physical Exam:     LMP  (LMP Unknown)   There is no height or weight on file to calculate BMI.    General Appearance: alert, no distress    Eyes:  Eyes grossly normal to inspection.  No discharge or erythema, or obvious scleral/conjunctival abnormalities.    Respiratory: No audible wheeze, cough, or visible cyanosis.  No visible retractions or increased  work of breathing.     Musculoskeletal: extremities non tender and without edema    Skin: no lesions or rashes on visible skin    Neurological: normal gait, no gross defects     Psychiatric: appropriate mood and affect. Mentation appears normal, affect normal/bright, judgement and insight intact, normal speech and appearance well-groomed                            The rest of a comprehensive physical examination is deferred due to video visit restrictions.      Assessment:    Estelita Waters is a 64 year old woman with a diagnosis of serous endometrial cancer.  She presents for a disease management visit by video.      60 minutes spent on the date of the encounter doing chart review, history and exam, documentation, and further activities as noted above.              Plan:     1.) Endometrial cancer:  Reviewed patient s diagnosis and treatment plan (including carboplatin and paclitaxel) as recommended by Dr. Burt. The goal of treatment is curative intent.  RTC 1-2 weeks for labs, provider visit, and 1st cycle.  Plan for follow up with Dr. Burt after cycle 6.  Currently scheduled appointments available via My Chart.  Reviewed signs and symptoms for when she should contact the clinic or seek additional care; and contact information of the Gynecologic Oncology Clinic.  Patient to contact the clinic with any questions or concerns in the interim.        Discussed that the administration of chemotherapy can carry certain risks, including failure to obtain the desired result, discomforts, injury, and the need for additional therapy. Reviewed possible side effects of these drugs including: Allergic reaction; Pancytopenia including Anemia causing weakness/fatigue, Low WBC causing infection, Low platelets causing bruising/bleeding; Mouth sores; Hair loss; Fatigue; Brief periods of forgetfulness; Nausea and vomiting; Neuropathy; Diarrhea/Constipation; Hair loss; Skin and nail changes; Liver effects; Heart effects;  Kidney/Bladder effects; Lung effects; Loss of appetite; Weight gain or loss; Visual/Auditory changes; Sexual effects; Mood effects; Menopausal symptoms; Reproductive/Fertility Effects; Other. Discussed that the patient may have side effects from chemotherapy that have not been discussed today because each patient may respond differently to chemotherapy and could have side effects that have not been previously reported by others.     Discussed ways to manage certain side effects at home and when to call the clinic or go to the emergency department.     Patient was provided by [facility] with a copy of [facility] drug information regarding (carboplatin and paclitaxel drugs); [employer]'s Cancer Care packet including: Getting Ready for Chemotherapy, Comparing Chemotherapy, Immunotherapy, and targeted Therapy, Your Cancer Care team and MyChart, Understanding Clinical Trials, Honoring Choices, Cancer Care Services, and Integrative Therapies.     Reviewed resources available including nutrition services, rehabilitation and exercise, pharmacy services, tobacco cessation programs, social work services, spiritual health, cancer risk management program, cancer survivorship program, and palliative care program.     Discussed nutrition and the importance of eating small frequent meal, high protein, and drinking 8-10 glasses of noncaffeinated and nonalcoholic beverages.         Genetic risk factors were assessed and she is scheduled for a new patient genetic counseling referral 5/12/25.    Labs and/or tests ordered include:  None.     2.) Health maintenance:  Issues addressed today include following up with PCP for annual health maintenance and non-gynecologic issues.       Shira Edward, DNP, APRN, FNP-C, AOCNP  Oncology Nurse Practitioner   Division of Gynecologic Oncology  Pager: [phone]     CC  Patient Care Team:  Katie Centeno DO as PCP - General  Wenceslao Burt MD as MD (Gynecologic  Oncology)  Wenceslao Burt MD as Assigned Cancer Care Provider  IVETT MIKE            Again, thank you for allowing me to participate in the care of your patient.        Sincerely,        VANESA Romero CNP

## 2024-10-10 NOTE — NURSING NOTE
Current patient location: [address] [place] [address] [address] [place] [address]    Is the patient currently in the state of [place]? YES    Visit mode:VIDEO    If the visit is dropped, the patient can be reconnected by: VIDEO VISIT: Send to e-mail at: [email or web address]    Will anyone else be joining the visit? NO  (If patient encounters technical issues they should call [phone]    Are changes needed to the allergy or medication list? Pt stated no changes to allergies and Pt stated no med changes    Are refills needed on medications prescribed by this physician? NO    Rooming Documentation:  Not applicable    Reason for visit: RECHECK    Sandhya BLUE

## 2024-10-10 NOTE — PROGRESS NOTES
Virtual Visit Details    Type of service:  Video Visit     Originating Location (pt. Location): Home    Distant Location (provider location):  On-site  Platform used for Video Visit: [facility]      Gynecologic Oncology Disease Management Visit Note    Date: Oct 10, 2024    RE: Estelita Waters  : 1960  KAYLEIGH: Oct 10, 2024    CC: Serous endometrial cancer    HPI:  Estelita Waters is a 64 year old woman with a diagnosis of serous endometrial cancer.  She presents for a disease management visit by video.    Oncology History:  Initially, she presented with PMB.    24: Pelvic US  IMPRESSION:   1. Circumscribed hyperechoic mass centered in the endometrium measuring up to   1.9 cm. Differential considerations include a submucosal fibroid, large   endometrial polyp and endometrial malignancy. Recommend tissue sampling for   further evaluation. Female pelvic MRI may provide additional diagnostic   information, could be considered for further evaluation.   2. Fibroid uterus.   3. No suspicious adnexal mass.     24: Diagnostic hysteroscopy, hysteroscopic myomectomy, dilation and curettage    A.  Endometrium, Endometrial curettings and fibroid, curettage:    High-grade carcinoma possibly arising in a polyp, favor serous,  pending POLE mutational status, see comment  Comment:  Stain Results Comments   P53 Negative, P53 ABNORMAL All performed on Block B4   P16 positive     WT1 positive     P53 shows completely absent staining of the epithelial component (abnormal) but wild type staining in the stromal elements (internal control).  MMR intact.   No mutations were detected in POLE.  The measured tumor mutation burden (TMB) is low in this sample.    24:  12.  CT CAP  IMPRESSION:  1.  No evidence of metastatic disease in the chest, abdomen, or  pelvis.    24: HYSTERECTOMY, TOTAL, ROBOT-ASSISTED, WITH BILATERAL SALPINGO-OOPHORECTOMY, AND BILATERAL PELVIC sentinel sampling, omentectomy   A. Lymph node, RIGHT  para-aortic, excision:  - One lymph node, negative for carcinoma (0/1)  B. Lymph node, LEFT external iliac, sentinel, biopsy:  - One lymph node, negative for carcinoma (0/1)  C. Uterus, cervix, bilateral fallopian tubes and ovaries, total laparoscopic hysterectomy with bilateral salpingo-oophorectomy:  - SEROUS CARCINOMA OF THE ENDOMETRIUM (size: 6.1 cm)  - Carcinoma invades 8 mm into a 14 mm myometrium (57%)  - Myometrial leiomyomas (largest: 3.2 cm) with hyalinization and calcification  - Cervix with Nabothian cysts   - Bilateral ovaries with focal surface adhesions  - Bilateral fallopian tubes with paratubal cysts and focal surface adhesions  - See tumor synoptic report below  D. Omentum, omentectomy:  - Fibroadipose tissue, negative for malignancy  Washings negative.  HER2 +2       Plan: Carboplatin AUC 6 and paclitaxel 175 mg/m2 IV every 3 weeks x 6 cycles.              Today she reports;    -Vaginal bleeding/discharge: No  -Abdominal pain: No  -Nausea or vomiting: No  -Appetite: Normal appetite  -Diarrhea/Constipation: Some gas and bloating, probiotics helping  -Neuropathy symptoms: No baseline neuropathy symptoms  -Leg swelling: No   -Fevers: No  -Chest pain: No  -SOB: No  No hx of DM  Prefers no port  Likes running and mountain biking  Normally esposito in [place]            Past Medical History:    Past Medical History:   Diagnosis Date    History of melanoma     Uterine cancer (H)          Past Surgical History:    Past Surgical History:   Procedure Laterality Date     SECTION      DAVINCI HYSTERECTOMY TOTAL, BILATERAL SALPINGO-OOPHORECTOMY, NODE DISSECTION, COMBINED Bilateral 2024    Procedure: HYSTERECTOMY, TOTAL, ROBOT-ASSISTED, WITH BILATERAL SALPINGO-OOPHORECTOMY, AND BILATERAL PELVIC sentinel sampling, omentectomy;  Surgeon: Wenceslao Burt MD;  [facility]    HYSTERECTOMY, TOTAL, ROBOT-ASSISTED, WITH BILATERAL SALPINGO-OOPHORECTOMY, AND BILATERAL PELVIC sentinel sampling, omentectomy  - Bilateral  09/24/2024    MOHS MICROGRAPHIC PROCEDURE      CO BREAST AUGMENTATION      WRIST SURGERY           Health Maintenance Due   Topic Date Due    ADVANCE CARE PLANNING  Never done    Pneumococcal Vaccine: Pediatrics (0 to 5 Years) and At-Risk Patients (6 to 64 Years) (1 of 2 - PCV) Never done    HIV SCREENING  Never done    HEPATITIS C SCREENING  Never done    PAP  Never done    LIPID  Never done    RSV VACCINE (1 - Risk 60-74 years 1-dose series) Never done    INFLUENZA VACCINE (1) 09/01/2024    COVID-19 Vaccine (4 - 2024-25 season) 09/01/2024       Current Medications:     Current Outpatient Medications   Medication Sig Dispense Refill    tretinoin (RETIN-A) 0.1 % external cream Apply 1 Application topically as needed.           Allergies:      No Known Allergies     Social History:     Social History     Tobacco Use    Smoking status: Never    Smokeless tobacco: Never   Substance Use Topics    Alcohol use: Yes     Alcohol/week: 4.0 standard drinks of alcohol     Types: 4 Glasses of wine per week       History   Drug Use Unknown         Family History:     The patient's family history is notable for:    Family History   Problem Relation Age of Onset    Pulmonary Embolism Mother         unknown cause    Deep Vein Thrombosis (DVT) Mother     Anesthesia Reaction No family hx of          Physical Exam:     LMP  (LMP Unknown)   There is no height or weight on file to calculate BMI.    General Appearance: alert, no distress    Eyes:  Eyes grossly normal to inspection.  No discharge or erythema, or obvious scleral/conjunctival abnormalities.    Respiratory: No audible wheeze, cough, or visible cyanosis.  No visible retractions or increased work of breathing.     Musculoskeletal: extremities non tender and without edema    Skin: no lesions or rashes on visible skin    Neurological: normal gait, no gross defects     Psychiatric: appropriate mood and affect. Mentation appears normal, affect normal/bright, judgement  and insight intact, normal speech and appearance well-groomed                            The rest of a comprehensive physical examination is deferred due to video visit restrictions.      Assessment:    Estelita Waters is a 64 year old woman with a diagnosis of serous endometrial cancer.  She presents for a disease management visit by video.      60 minutes spent on the date of the encounter doing chart review, history and exam, documentation, and further activities as noted above.              Plan:     1.) Endometrial cancer:  Reviewed patient s diagnosis and treatment plan (including carboplatin and paclitaxel) as recommended by Dr. Burt. The goal of treatment is curative intent.  RTC 1-2 weeks for labs, provider visit, and 1st cycle.  Plan for follow up with Dr. Burt after cycle 6.  Currently scheduled appointments available via My Chart.  Reviewed signs and symptoms for when she should contact the clinic or seek additional care; and contact information of the Gynecologic Oncology Clinic.  Patient to contact the clinic with any questions or concerns in the interim.        Discussed that the administration of chemotherapy can carry certain risks, including failure to obtain the desired result, discomforts, injury, and the need for additional therapy. Reviewed possible side effects of these drugs including: Allergic reaction; Pancytopenia including Anemia causing weakness/fatigue, Low WBC causing infection, Low platelets causing bruising/bleeding; Mouth sores; Hair loss; Fatigue; Brief periods of forgetfulness; Nausea and vomiting; Neuropathy; Diarrhea/Constipation; Hair loss; Skin and nail changes; Liver effects; Heart effects; Kidney/Bladder effects; Lung effects; Loss of appetite; Weight gain or loss; Visual/Auditory changes; Sexual effects; Mood effects; Menopausal symptoms; Reproductive/Fertility Effects; Other. Discussed that the patient may have side effects from chemotherapy that have not been  discussed today because each patient may respond differently to chemotherapy and could have side effects that have not been previously reported by others.     Discussed ways to manage certain side effects at home and when to call the clinic or go to the emergency department.     Patient was provided by [facility] with a copy of [facility] drug information regarding (carboplatin and paclitaxel drugs); [employer]'s Cancer Care packet including: Getting Ready for Chemotherapy, Comparing Chemotherapy, Immunotherapy, and targeted Therapy, Your Cancer Care team and MyChart, Understanding Clinical Trials, Honoring Choices, Cancer Care Services, and Integrative Therapies.     Reviewed resources available including nutrition services, rehabilitation and exercise, pharmacy services, tobacco cessation programs, social work services, spiritual health, cancer risk management program, cancer survivorship program, and palliative care program.     Discussed nutrition and the importance of eating small frequent meal, high protein, and drinking 8-10 glasses of noncaffeinated and nonalcoholic beverages.         Genetic risk factors were assessed and she is scheduled for a new patient genetic counseling referral 5/12/25.    Labs and/or tests ordered include:  None.     2.) Health maintenance:  Issues addressed today include following up with PCP for annual health maintenance and non-gynecologic issues.       Shira Mike, DNP, APRN, FNP-C, AOCNP  Oncology Nurse Practitioner   Division of Gynecologic Oncology  Pager: [phone]     CC  Patient Care Team:  Katie Centeno DO as PCP - General  Wenceslao Burt MD as MD (Gynecologic Oncology)  Wenceslao Burt MD as Assigned Cancer Care Provider  SHIRA MIKE

## 2024-10-15 DIAGNOSIS — L65.9 ALOPECIA: Primary | ICD-10-CM

## 2024-10-18 NOTE — PROGRESS NOTES
Gynecologic Oncology Return Visit Note    Date: Oct 21, 2024     RE: Estelita Waters  : 1960  KAYLEIGH: Oct 21, 2024     CC: ***    HPI:  Estelita Waters is a 64 year old woman with a diagnosis of  ***.  She is here today for ***.     Oncology History:    ***      -Nausea or vomiting: {Yes / No:769329}  -Appetite: {Yes / No:423082}  -Weight loss: {Yes / No:265304}  -Diarrhea/Constipation: {Yes / No:671453}  -Neuropathy symptoms: {Yes / No:050891}  -Leg swelling: {Yes / No:224121}  -Fevers: {Yes / No:431286}  -Chest pain: {Yes / No:653047}  -SOB: {Yes / No:681093}    -BP: {Yes / No:813252}  -Headaches: {Yes / No:326593}  -Vision changes: {Yes / No:726828}    -Eye symptoms: {Yes / No:682543}    -Rashes/skin changes: {Yes / No:492326}  Mouth sores: {Yes / No:425759}    -Vaginal bleeding/discharge: {Yes / No:040753}  -Abdominal pain: {Yes / No:563362}              Health Maintenance  Colonoscopy: ***  Mammogram: ***  Annual physical: ***    Past Medical History:    Past Medical History:   Diagnosis Date    History of melanoma     Uterine cancer (H)          Past Surgical History:    Past Surgical History:   Procedure Laterality Date     SECTION      DAVINCI HYSTERECTOMY TOTAL, BILATERAL SALPINGO-OOPHORECTOMY, NODE DISSECTION, COMBINED Bilateral 2024    Procedure: HYSTERECTOMY, TOTAL, ROBOT-ASSISTED, WITH BILATERAL SALPINGO-OOPHORECTOMY, AND BILATERAL PELVIC sentinel sampling, omentectomy;  Surgeon: Wenceslao Burt MD;  Location: [facility] OR    HYSTERECTOMY, TOTAL, ROBOT-ASSISTED, WITH BILATERAL SALPINGO-OOPHORECTOMY, AND BILATERAL PELVIC sentinel sampling, omentectomy - Bilateral  2024    MOHS MICROGRAPHIC PROCEDURE      OH BREAST AUGMENTATION      WRIST SURGERY           Health Maintenance Due   Topic Date Due    ADVANCE CARE PLANNING  Never done    Pneumococcal Vaccine: Pediatrics (0 to 5 Years) and At-Risk Patients (6 to 64 Years) (1 of 2 - PCV) Never done    HIV SCREENING  Never done    HEPATITIS  C SCREENING  Never done    PAP  Never done    LIPID  Never done    RSV VACCINE (1 - Risk 60-74 years 1-dose series) Never done    INFLUENZA VACCINE (1) 09/01/2024    COVID-19 Vaccine (4 - 2024-25 season) 09/01/2024       Current Medications:     Current Outpatient Medications   Medication Sig Dispense Refill    tretinoin (RETIN-A) 0.1 % external cream Apply 1 Application topically as needed.           Allergies:      No Known Allergies     Social History:     Social History     Tobacco Use    Smoking status: Never    Smokeless tobacco: Never   Substance Use Topics    Alcohol use: Yes     Alcohol/week: 4.0 standard drinks of alcohol     Types: 4 Glasses of wine per week       History   Drug Use Unknown         Family History:     The patient's family history is notable for:    Family History   Problem Relation Age of Onset    Pulmonary Embolism Mother         unknown cause    Deep Vein Thrombosis (DVT) Mother     Anesthesia Reaction No family hx of          Physical Exam:     LMP  (LMP Unknown)   There is no height or weight on file to calculate BMI.    General Appearance:  alert, no distress     HEENT: no palpable nodules or masses        Cardiovascular: regular rate and rhythm, no gallops, rubs or murmurs     Respiratory: lungs clear, no rales, rhonchi or wheezes    Musculoskeletal: extremities non tender and without edema    Skin: no lesions or rashes     Neurological: normal gait, no gross defects     Psychiatric: appropriate mood and affect                               Hematological: normal cervical, supraclavicular and inguinal lymph nodes     Gastrointestinal:       abdomen soft, non-tender, non-distended, no organomegaly or masses    Genitourinary: External genitalia and urethral meatus appears normal.  Vagina is smooth without nodularity or masses.  Cervix ***.  Bimanual exam reveal no masses, nodularity or fullness.  Recto-vaginal exam confirms these findings.      No results found for this or any previous  visit (from the past 24 hour(s)).       Assessment:    Estelita Waters is a 64 year old woman with a diagnosis of ***.     *** minutes spent on the date of the encounter doing chart review, history and exam, documentation, and further activities as noted above.      Plan:     1.) ***:  RTC in ***.  Reviewed signs and symptoms for when she should contact the clinic or seek additional care.  Patient to contact the clinic with any questions or concerns in the interim.      Genetic risk factors were assessed ***.    Labs and/or tests ordered include:  ***.     2.) Health maintenance:  Issues addressed today include following up with PCP for annual health maintenance and non-gynecologic issues.       Ivett Mike, DNP, APRN, FNP-C, AOCNP  Oncology Nurse Practitioner  Division of Gynecologic Oncology  Pager: [phone]     CC  Patient Care Team:  Katie Centeno DO as PCP - General  Wenceslao Burt MD as MD (Gynecologic Oncology)  Wenceslao Burt MD as Assigned Cancer Care Provider  IVETT MIKE

## 2024-10-20 NOTE — PROGRESS NOTES
Virtual Visit Details    Type of service:  Video Visit     Originating Location (pt. Location): Home    Distant Location (provider location):  On-site  Platform used for Video Visit: [facility]        Gynecologic Oncology Return Visit Note    Date: Oct 21, 2024     RE: Estelita Waters  : 1960  KAYLEIGH: Oct 21, 2024     CC: Serous endometrial cancer     HPI:  Estelita Waters is a 64 year old woman with a diagnosis of  Serous endometrial cancer.  She is here today for disease management.     Oncology History:  Initially, she presented with PMB.     24: Pelvic US  IMPRESSION:   1. Circumscribed hyperechoic mass centered in the endometrium measuring up to   1.9 cm. Differential considerations include a submucosal fibroid, large   endometrial polyp and endometrial malignancy. Recommend tissue sampling for   further evaluation. Female pelvic MRI may provide additional diagnostic   information, could be considered for further evaluation.   2. Fibroid uterus.   3. No suspicious adnexal mass.      24: Diagnostic hysteroscopy, hysteroscopic myomectomy, dilation and curettage    A.  Endometrium, Endometrial curettings and fibroid, curettage:    High-grade carcinoma possibly arising in a polyp, favor serous,  pending POLE mutational status, see comment  Comment:  Stain Results Comments   P53 Negative, P53 ABNORMAL All performed on Block B4   P16 positive     WT1 positive     P53 shows completely absent staining of the epithelial component (abnormal) but wild type staining in the stromal elements (internal control).  MMR intact.   No mutations were detected in POLE.  The measured tumor mutation burden (TMB) is low in this sample.     24:  12.  CT CAP  IMPRESSION:  1.  No evidence of metastatic disease in the chest, abdomen, or  pelvis.     24: HYSTERECTOMY, TOTAL, ROBOT-ASSISTED, WITH BILATERAL SALPINGO-OOPHORECTOMY, AND BILATERAL PELVIC sentinel sampling, omentectomy   A. Lymph node, RIGHT para-aortic,  excision:  - One lymph node, negative for carcinoma (0/1)  B. Lymph node, LEFT external iliac, sentinel, biopsy:  - One lymph node, negative for carcinoma (0/1)  C. Uterus, cervix, bilateral fallopian tubes and ovaries, total laparoscopic hysterectomy with bilateral salpingo-oophorectomy:  - SEROUS CARCINOMA OF THE ENDOMETRIUM (size: 6.1 cm)  - Carcinoma invades 8 mm into a 14 mm myometrium (57%)  - Myometrial leiomyomas (largest: 3.2 cm) with hyalinization and calcification  - Cervix with Nabothian cysts   - Bilateral ovaries with focal surface adhesions  - Bilateral fallopian tubes with paratubal cysts and focal surface adhesions  - See tumor synoptic report below  D. Omentum, omentectomy:  - Fibroadipose tissue, negative for malignancy  Washings negative.  HER2 +2         Plan: Carboplatin AUC 6 and paclitaxel 175 mg/m2 IV every 3 weeks x 6 cycles.      Today, patient reports    -Vaginal bleeding/discharge: No  -Abdominal pain: No but does get intermittent gas/bloating that has increased since hysterectomy. Probiotics and Maalox helpful  -Nausea or vomiting: No  -Appetite: Good appetite  -Weight loss:  Down 4 lb , thinks more likely due to decreased activity since surgery  -Diarrhea/Constipation: Yes, reports constipation. Using probiotics and Maalox to help with gas and constipation  -Neuropathy symptoms: No   -Leg swelling: No  -Fevers: No  -Chest pain: No  -SOB: No  -Rashes/skin changes: No          Health Maintenance  Colonoscopy: 21  Mammogram: 23  Annual physical: 24    Past Medical History:    Past Medical History:   Diagnosis Date    History of melanoma     Uterine cancer (H)          Past Surgical History:    Past Surgical History:   Procedure Laterality Date     SECTION      DAVINCI HYSTERECTOMY TOTAL, BILATERAL SALPINGO-OOPHORECTOMY, NODE DISSECTION, COMBINED Bilateral 2024    Procedure: HYSTERECTOMY, TOTAL, ROBOT-ASSISTED, WITH BILATERAL SALPINGO-OOPHORECTOMY, AND BILATERAL  PELVIC sentinel sampling, omentectomy;  Surgeon: Wencesloa Burt MD;  Location: [facility] OR    HYSTERECTOMY, TOTAL, ROBOT-ASSISTED, WITH BILATERAL SALPINGO-OOPHORECTOMY, AND BILATERAL PELVIC sentinel sampling, omentectomy - Bilateral  09/24/2024    MOHS MICROGRAPHIC PROCEDURE      AL BREAST AUGMENTATION      WRIST SURGERY           Health Maintenance Due   Topic Date Due    ADVANCE CARE PLANNING  Never done    Pneumococcal Vaccine: Pediatrics (0 to 5 Years) and At-Risk Patients (6 to 64 Years) (1 of 2 - PCV) Never done    HIV SCREENING  Never done    HEPATITIS C SCREENING  Never done    PAP  Never done    LIPID  Never done    RSV VACCINE (1 - Risk 60-74 years 1-dose series) Never done    COVID-19 Vaccine (4 - 2024-25 season) 09/01/2024       Current Medications:     Current Outpatient Medications   Medication Sig Dispense Refill    tretinoin (RETIN-A) 0.1 % external cream Apply 1 Application topically as needed.           Allergies:      No Known Allergies     Social History:     Social History     Tobacco Use    Smoking status: Never    Smokeless tobacco: Never   Substance Use Topics    Alcohol use: Yes     Alcohol/week: 4.0 standard drinks of alcohol     Types: 4 Glasses of wine per week       History   Drug Use Unknown       Family History:     The patient's family history is notable for:    Family History   Problem Relation Age of Onset    Pulmonary Embolism Mother         unknown cause    Deep Vein Thrombosis (DVT) Mother     Anesthesia Reaction No family hx of        Physical Exam:     General Appearance: alert, no distress    Eyes:  Eyes grossly normal to inspection.  No discharge or erythema, or obvious scleral/conjunctival abnormalities.    Respiratory: No audible wheeze, cough, or visible cyanosis.  No visible retractions or increased work of breathing.     Musculoskeletal: extremities non tender and without edema    Skin: no lesions or rashes on visible skin    Neurological: normal gait, no gross  defects     Psychiatric: appropriate mood and affect. Mentation appears normal, affect normal/bright, judgement and insight intact, normal speech and appearance well-groomed                            The rest of a comprehensive physical examination is deferred due to video visit restrictions.        Assessment:    Estelita Waters is a 64 year old woman with a diagnosis of serous endometrial cancer.     30 minutes spent on the date of the encounter doing chart review, history and exam, documentation, and further activities as noted above.      Plan:     1.) Endometrial cancer:  OK to initiate first cycle of treatment pending labs and VS tomorrow. Cycle 1 to be signed by Dr. Burt.  Reviewed patient s diagnosis and treatment plan (including carboplatin and paclitaxel) as recommended by Dr. Burt. The goal of treatment is curative intent.  RTC prior to cycle 2 for provider visit; this is already scheduled. Plan for follow up with Dr. Burt after cycle 6.  Currently scheduled appointments available via My Chart.  Reviewed anticipated SE and timeline.  Reviewed signs and symptoms for when she should contact the clinic or seek additional care; and contact information of the Gynecologic Oncology Clinic.  Patient to contact the clinic with any questions or concerns in the interim.       Genetic risk factors were assessed and she is scheduled for a new patient genetic counseling referral 5/12/25.    Labs and/or tests ordered include:  none.     2.) Health maintenance:  Issues addressed today include following up with PCP for annual health maintenance and non-gynecologic issues.       PATRICK HaasN, RN, OCN  Student Nurse Practitioner    I was with student Sophie Spence throughout visit and agree with HPI, exam, and plan as written.    Shira Edward, DNP, APRN, FNP-C, AOCNP  Oncology Nurse Practitioner   Division of Gynecologic Oncology  Pager: [phone]       CC  Patient Care Team:  Gemini Abarca  DO Katie as PCP - General  Wenceslao Burt MD as MD (Gynecologic Oncology)  Wenceslao Burt MD as Assigned Cancer Care Provider  IVETT MIKE

## 2024-10-21 ENCOUNTER — VIRTUAL VISIT (OUTPATIENT)
Dept: ONCOLOGY | Facility: CLINIC | Age: 64
End: 2024-10-21
Attending: NURSE PRACTITIONER
Payer: COMMERCIAL

## 2024-10-21 ENCOUNTER — DOCUMENTATION ONLY (OUTPATIENT)
Dept: ONCOLOGY | Facility: CLINIC | Age: 64
End: 2024-10-21
Payer: COMMERCIAL

## 2024-10-21 VITALS — BODY MASS INDEX: 20.72 KG/M2 | WEIGHT: 132 LBS | HEIGHT: 67 IN

## 2024-10-21 DIAGNOSIS — Z51.11 ENCOUNTER FOR ANTINEOPLASTIC CHEMOTHERAPY: ICD-10-CM

## 2024-10-21 DIAGNOSIS — C54.1 ENDOMETRIAL CANCER (H): Primary | ICD-10-CM

## 2024-10-21 PROCEDURE — 99214 OFFICE O/P EST MOD 30 MIN: CPT | Mod: 24 | Performed by: NURSE PRACTITIONER

## 2024-10-21 RX ORDER — DIPHENHYDRAMINE HYDROCHLORIDE 50 MG/ML
50 INJECTION INTRAMUSCULAR; INTRAVENOUS
Status: CANCELLED
Start: 2024-10-21

## 2024-10-21 RX ORDER — HEPARIN SODIUM,PORCINE 10 UNIT/ML
5-20 VIAL (ML) INTRAVENOUS DAILY PRN
Status: CANCELLED | OUTPATIENT
Start: 2024-10-21

## 2024-10-21 RX ORDER — ALBUTEROL SULFATE 0.83 MG/ML
2.5 SOLUTION RESPIRATORY (INHALATION)
Status: CANCELLED | OUTPATIENT
Start: 2024-10-21

## 2024-10-21 RX ORDER — MEPERIDINE HYDROCHLORIDE 25 MG/ML
25 INJECTION INTRAMUSCULAR; INTRAVENOUS; SUBCUTANEOUS EVERY 30 MIN PRN
Status: CANCELLED | OUTPATIENT
Start: 2024-10-21

## 2024-10-21 RX ORDER — LORAZEPAM 2 MG/ML
0.5 INJECTION INTRAMUSCULAR EVERY 4 HOURS PRN
Status: CANCELLED | OUTPATIENT
Start: 2024-10-21

## 2024-10-21 RX ORDER — EPINEPHRINE 1 MG/ML
0.3 INJECTION, SOLUTION INTRAMUSCULAR; SUBCUTANEOUS EVERY 5 MIN PRN
Status: CANCELLED | OUTPATIENT
Start: 2024-10-21

## 2024-10-21 RX ORDER — PALONOSETRON 0.05 MG/ML
0.25 INJECTION, SOLUTION INTRAVENOUS ONCE
Status: CANCELLED | OUTPATIENT
Start: 2024-10-22

## 2024-10-21 RX ORDER — ALBUTEROL SULFATE 90 UG/1
1-2 INHALANT RESPIRATORY (INHALATION)
Status: CANCELLED
Start: 2024-10-21

## 2024-10-21 RX ORDER — HEPARIN SODIUM (PORCINE) LOCK FLUSH IV SOLN 100 UNIT/ML 100 UNIT/ML
5 SOLUTION INTRAVENOUS
Status: CANCELLED | OUTPATIENT
Start: 2024-10-21

## 2024-10-21 RX ORDER — METHYLPREDNISOLONE SODIUM SUCCINATE 125 MG/2ML
125 INJECTION INTRAMUSCULAR; INTRAVENOUS
Status: CANCELLED
Start: 2024-10-21

## 2024-10-21 RX ORDER — DIPHENHYDRAMINE HCL 25 MG
50 CAPSULE ORAL ONCE
Status: CANCELLED
Start: 2024-10-21

## 2024-10-21 RX ORDER — DEXAMETHASONE SODIUM PHOSPHATE 10 MG/ML
12 INJECTION, SOLUTION INTRAMUSCULAR; INTRAVENOUS ONCE
Status: CANCELLED
Start: 2024-10-21 | End: 2024-10-21

## 2024-10-21 RX ORDER — DEXAMETHASONE 4 MG/1
8 TABLET ORAL DAILY
Qty: 6 TABLET | Refills: 5 | Status: SHIPPED | OUTPATIENT
Start: 2024-10-21

## 2024-10-21 RX ORDER — PROCHLORPERAZINE MALEATE 10 MG
10 TABLET ORAL EVERY 6 HOURS PRN
Qty: 30 TABLET | Refills: 2 | Status: SHIPPED | OUTPATIENT
Start: 2024-10-21

## 2024-10-21 RX ORDER — ONDANSETRON 8 MG/1
8 TABLET, FILM COATED ORAL EVERY 8 HOURS PRN
Qty: 30 TABLET | Refills: 2 | Status: SHIPPED | OUTPATIENT
Start: 2024-10-21

## 2024-10-21 ASSESSMENT — PAIN SCALES - GENERAL: PAINLEVEL: NO PAIN (0)

## 2024-10-21 NOTE — LETTER
10/21/2024      Estelita Waters  [address] [place] [address]      Dear Colleague,    Thank you for referring your patient, Estelita Waters, to the [facility]. Please see a copy of my visit note below.    Virtual Visit Details    Type of service:  Video Visit     Originating Location (pt. Location): Home    Distant Location (provider location):  On-site  Platform used for Video Visit: [facility]        Gynecologic Oncology Return Visit Note    Date: Oct 21, 2024     RE: Estelita Waters  : 1960  KAYLEIGH: Oct 21, 2024     CC: Serous endometrial cancer     HPI:  Estelita Waters is a 64 year old woman with a diagnosis of  Serous endometrial cancer.  She is here today for disease management.     Oncology History:  Initially, she presented with PMB.     24: Pelvic US  IMPRESSION:   1. Circumscribed hyperechoic mass centered in the endometrium measuring up to   1.9 cm. Differential considerations include a submucosal fibroid, large   endometrial polyp and endometrial malignancy. Recommend tissue sampling for   further evaluation. Female pelvic MRI may provide additional diagnostic   information, could be considered for further evaluation.   2. Fibroid uterus.   3. No suspicious adnexal mass.      24: Diagnostic hysteroscopy, hysteroscopic myomectomy, dilation and curettage    A.  Endometrium, Endometrial curettings and fibroid, curettage:    High-grade carcinoma possibly arising in a polyp, favor serous,  pending POLE mutational status, see comment  Comment:  Stain Results Comments   P53 Negative, P53 ABNORMAL All performed on Block B4   P16 positive     WT1 positive     P53 shows completely absent staining of the epithelial component (abnormal) but wild type staining in the stromal elements (internal control).  MMR intact.   No mutations were detected in POLE.  The measured tumor mutation burden (TMB) is low in this sample.     24:  12.  CT CAP  IMPRESSION:  1.  No  evidence of metastatic disease in the chest, abdomen, or  pelvis.     9/24/24: HYSTERECTOMY, TOTAL, ROBOT-ASSISTED, WITH BILATERAL SALPINGO-OOPHORECTOMY, AND BILATERAL PELVIC sentinel sampling, omentectomy   A. Lymph node, RIGHT para-aortic, excision:  - One lymph node, negative for carcinoma (0/1)  B. Lymph node, LEFT external iliac, sentinel, biopsy:  - One lymph node, negative for carcinoma (0/1)  C. Uterus, cervix, bilateral fallopian tubes and ovaries, total laparoscopic hysterectomy with bilateral salpingo-oophorectomy:  - SEROUS CARCINOMA OF THE ENDOMETRIUM (size: 6.1 cm)  - Carcinoma invades 8 mm into a 14 mm myometrium (57%)  - Myometrial leiomyomas (largest: 3.2 cm) with hyalinization and calcification  - Cervix with Nabothian cysts   - Bilateral ovaries with focal surface adhesions  - Bilateral fallopian tubes with paratubal cysts and focal surface adhesions  - See tumor synoptic report below  D. Omentum, omentectomy:  - Fibroadipose tissue, negative for malignancy  Washings negative.  HER2 +2         Plan: Carboplatin AUC 6 and paclitaxel 175 mg/m2 IV every 3 weeks x 6 cycles.      Today, patient reports    -Vaginal bleeding/discharge: No  -Abdominal pain: No but does get intermittent gas/bloating that has increased since hysterectomy. Probiotics and Maalox helpful  -Nausea or vomiting: No  -Appetite: Good appetite  -Weight loss:  Down 4 lb , thinks more likely due to decreased activity since surgery  -Diarrhea/Constipation: Yes, reports constipation. Using probiotics and Maalox to help with gas and constipation  -Neuropathy symptoms: No   -Leg swelling: No  -Fevers: No  -Chest pain: No  -SOB: No  -Rashes/skin changes: No          Health Maintenance  Colonoscopy: 4//7/21  Mammogram: 9/18/23  Annual physical: 7/29/24    Past Medical History:    Past Medical History:   Diagnosis Date     History of melanoma      Uterine cancer (H)          Past Surgical History:    Past Surgical History:   Procedure  Laterality Date      SECTION       DAVINCI HYSTERECTOMY TOTAL, BILATERAL SALPINGO-OOPHORECTOMY, NODE DISSECTION, COMBINED Bilateral 2024    Procedure: HYSTERECTOMY, TOTAL, ROBOT-ASSISTED, WITH BILATERAL SALPINGO-OOPHORECTOMY, AND BILATERAL PELVIC sentinel sampling, omentectomy;  Surgeon: Wenceslao Burt MD;  Location: [facility] OR     HYSTERECTOMY, TOTAL, ROBOT-ASSISTED, WITH BILATERAL SALPINGO-OOPHORECTOMY, AND BILATERAL PELVIC sentinel sampling, omentectomy - Bilateral  2024     MOHS MICROGRAPHIC PROCEDURE       SC BREAST AUGMENTATION       WRIST SURGERY           Health Maintenance Due   Topic Date Due     ADVANCE CARE PLANNING  Never done     Pneumococcal Vaccine: Pediatrics (0 to 5 Years) and At-Risk Patients (6 to 64 Years) (1 of 2 - PCV) Never done     HIV SCREENING  Never done     HEPATITIS C SCREENING  Never done     PAP  Never done     LIPID  Never done     RSV VACCINE (1 - Risk 60-74 years 1-dose series) Never done     COVID-19 Vaccine (2024- season) 2024       Current Medications:     Current Outpatient Medications   Medication Sig Dispense Refill     tretinoin (RETIN-A) 0.1 % external cream Apply 1 Application topically as needed.           Allergies:      No Known Allergies     Social History:     Social History     Tobacco Use     Smoking status: Never     Smokeless tobacco: Never   Substance Use Topics     Alcohol use: Yes     Alcohol/week: 4.0 standard drinks of alcohol     Types: 4 Glasses of wine per week       History   Drug Use Unknown       Family History:     The patient's family history is notable for:    Family History   Problem Relation Age of Onset     Pulmonary Embolism Mother         unknown cause     Deep Vein Thrombosis (DVT) Mother      Anesthesia Reaction No family hx of        Physical Exam:     General Appearance: alert, no distress    Eyes:  Eyes grossly normal to inspection.  No discharge or erythema, or obvious scleral/conjunctival  abnormalities.    Respiratory: No audible wheeze, cough, or visible cyanosis.  No visible retractions or increased work of breathing.     Musculoskeletal: extremities non tender and without edema    Skin: no lesions or rashes on visible skin    Neurological: normal gait, no gross defects     Psychiatric: appropriate mood and affect. Mentation appears normal, affect normal/bright, judgement and insight intact, normal speech and appearance well-groomed                            The rest of a comprehensive physical examination is deferred due to video visit restrictions.        Assessment:    Estelita Waters is a 64 year old woman with a diagnosis of serous endometrial cancer.     30 minutes spent on the date of the encounter doing chart review, history and exam, documentation, and further activities as noted above.      Plan:     1.) Endometrial cancer:  OK to initiate first cycle of treatment pending labs and VS tomorrow. Cycle 1 to be signed by Dr. Burt.  Reviewed patient s diagnosis and treatment plan (including carboplatin and paclitaxel) as recommended by Dr. Burt. The goal of treatment is curative intent.  RTC prior to cycle 2 for provider visit; this is already scheduled. Plan for follow up with Dr. Burt after cycle 6.  Currently scheduled appointments available via My Chart.  Reviewed anticipated SE and timeline.  Reviewed signs and symptoms for when she should contact the clinic or seek additional care; and contact information of the Gynecologic Oncology Clinic.  Patient to contact the clinic with any questions or concerns in the interim.       Genetic risk factors were assessed and she is scheduled for a new patient genetic counseling referral 5/12/25.    Labs and/or tests ordered include:  none.     2.) Health maintenance:  Issues addressed today include following up with PCP for annual health maintenance and non-gynecologic issues.       Sophie Spence, PATRICKN, RN, OCN  Student Nurse  Practitioner    I was with student Sophie Spence throughout visit and agree with HPI, exam, and plan as written.    Ivett Mike, DNP, APRN, FNP-C, AOCNP  Oncology Nurse Practitioner   Division of Gynecologic Oncology  Pager: [phone]       CC  Patient Care Team:  Katie Centeno DO as PCP - General  Wenceslao Burt MD as MD (Gynecologic Oncology)  Wenceslao Burt MD as Assigned Cancer Care Provider  IVETT MIKE      Again, thank you for allowing me to participate in the care of your patient.        Sincerely,        VANESA Romero CNP

## 2024-10-21 NOTE — NURSING NOTE
Current patient location: [address] [place] [address] [address] [place] [address]    Is the patient currently in the state of [place]? YES    Visit mode:VIDEO    If the visit is dropped, the patient can be reconnected by: VIDEO VISIT: Send to e-mail at: [email or web address]    Will anyone else be joining the visit? NO  (If patient encounters technical issues they should call [phone])    Are changes needed to the allergy or medication list? No    Are refills needed on medications prescribed by this physician? NO    Rooming Documentation:  Questionnaire(s) not done per department protocol    Reason for visit: ALEXI BLUE

## 2024-10-22 ENCOUNTER — INFUSION THERAPY VISIT (OUTPATIENT)
Dept: INFUSION THERAPY | Facility: CLINIC | Age: 64
End: 2024-10-22
Attending: OBSTETRICS & GYNECOLOGY
Payer: COMMERCIAL

## 2024-10-22 VITALS
HEART RATE: 51 BPM | RESPIRATION RATE: 16 BRPM | DIASTOLIC BLOOD PRESSURE: 78 MMHG | TEMPERATURE: 97.4 F | BODY MASS INDEX: 21.8 KG/M2 | WEIGHT: 139.2 LBS | SYSTOLIC BLOOD PRESSURE: 127 MMHG | OXYGEN SATURATION: 97 %

## 2024-10-22 DIAGNOSIS — C54.1 ENDOMETRIAL CANCER (H): Primary | ICD-10-CM

## 2024-10-22 LAB
ALBUMIN SERPL BCG-MCNC: 4.3 G/DL (ref 3.5–5.2)
ALP SERPL-CCNC: 72 U/L (ref 40–150)
ALT SERPL W P-5'-P-CCNC: 19 U/L (ref 0–50)
ANION GAP SERPL CALCULATED.3IONS-SCNC: 10 MMOL/L (ref 7–15)
AST SERPL W P-5'-P-CCNC: 26 U/L (ref 0–45)
BASOPHILS # BLD AUTO: 0.1 10E3/UL (ref 0–0.2)
BASOPHILS NFR BLD AUTO: 2 %
BILIRUB SERPL-MCNC: 0.5 MG/DL
BUN SERPL-MCNC: 15.5 MG/DL (ref 8–23)
CALCIUM SERPL-MCNC: 10 MG/DL (ref 8.8–10.4)
CHLORIDE SERPL-SCNC: 103 MMOL/L (ref 98–107)
CREAT SERPL-MCNC: 0.61 MG/DL (ref 0.51–0.95)
EGFRCR SERPLBLD CKD-EPI 2021: >90 ML/MIN/1.73M2
EOSINOPHIL # BLD AUTO: 0.7 10E3/UL (ref 0–0.7)
EOSINOPHIL NFR BLD AUTO: 11 %
ERYTHROCYTE [DISTWIDTH] IN BLOOD BY AUTOMATED COUNT: 11.8 % (ref 10–15)
GLUCOSE SERPL-MCNC: 97 MG/DL (ref 70–99)
HCO3 SERPL-SCNC: 26 MMOL/L (ref 22–29)
HCT VFR BLD AUTO: 35.6 % (ref 35–47)
HGB BLD-MCNC: 12.4 G/DL (ref 11.7–15.7)
IMM GRANULOCYTES # BLD: 0 10E3/UL
IMM GRANULOCYTES NFR BLD: 0 %
LYMPHOCYTES # BLD AUTO: 2.4 10E3/UL (ref 0.8–5.3)
LYMPHOCYTES NFR BLD AUTO: 41 %
MAGNESIUM SERPL-MCNC: 1.9 MG/DL (ref 1.7–2.3)
MCH RBC QN AUTO: 31 PG (ref 26.5–33)
MCHC RBC AUTO-ENTMCNC: 34.8 G/DL (ref 31.5–36.5)
MCV RBC AUTO: 89 FL (ref 78–100)
MONOCYTES # BLD AUTO: 0.6 10E3/UL (ref 0–1.3)
MONOCYTES NFR BLD AUTO: 10 %
NEUTROPHILS # BLD AUTO: 2 10E3/UL (ref 1.6–8.3)
NEUTROPHILS NFR BLD AUTO: 35 %
NRBC # BLD AUTO: 0 10E3/UL
NRBC BLD AUTO-RTO: 0 /100
PLATELET # BLD AUTO: 337 10E3/UL (ref 150–450)
POTASSIUM SERPL-SCNC: 4 MMOL/L (ref 3.4–5.3)
PROT SERPL-MCNC: 6.9 G/DL (ref 6.4–8.3)
RBC # BLD AUTO: 4 10E6/UL (ref 3.8–5.2)
SODIUM SERPL-SCNC: 139 MMOL/L (ref 135–145)
WBC # BLD AUTO: 5.7 10E3/UL (ref 4–11)

## 2024-10-22 PROCEDURE — 96417 CHEMO IV INFUS EACH ADDL SEQ: CPT

## 2024-10-22 PROCEDURE — 85004 AUTOMATED DIFF WBC COUNT: CPT | Performed by: NURSE PRACTITIONER

## 2024-10-22 PROCEDURE — 250N000011 HC RX IP 250 OP 636: Performed by: NURSE PRACTITIONER

## 2024-10-22 PROCEDURE — 96413 CHEMO IV INFUSION 1 HR: CPT

## 2024-10-22 PROCEDURE — 84155 ASSAY OF PROTEIN SERUM: CPT | Performed by: NURSE PRACTITIONER

## 2024-10-22 PROCEDURE — 83735 ASSAY OF MAGNESIUM: CPT | Performed by: NURSE PRACTITIONER

## 2024-10-22 PROCEDURE — 85014 HEMATOCRIT: CPT | Performed by: NURSE PRACTITIONER

## 2024-10-22 PROCEDURE — 96415 CHEMO IV INFUSION ADDL HR: CPT

## 2024-10-22 PROCEDURE — 258N000003 HC RX IP 258 OP 636: Performed by: NURSE PRACTITIONER

## 2024-10-22 PROCEDURE — 36415 COLL VENOUS BLD VENIPUNCTURE: CPT | Performed by: NURSE PRACTITIONER

## 2024-10-22 PROCEDURE — 250N000011 HC RX IP 250 OP 636: Performed by: OBSTETRICS & GYNECOLOGY

## 2024-10-22 PROCEDURE — 96375 TX/PRO/DX INJ NEW DRUG ADDON: CPT

## 2024-10-22 PROCEDURE — 258N000003 HC RX IP 258 OP 636: Performed by: OBSTETRICS & GYNECOLOGY

## 2024-10-22 RX ORDER — DEXAMETHASONE SODIUM PHOSPHATE 4 MG/ML
12 INJECTION, SOLUTION INTRA-ARTICULAR; INTRALESIONAL; INTRAMUSCULAR; INTRAVENOUS; SOFT TISSUE ONCE
Status: COMPLETED | OUTPATIENT
Start: 2024-10-22 | End: 2024-10-22

## 2024-10-22 RX ORDER — DIPHENHYDRAMINE HYDROCHLORIDE 50 MG/ML
50 INJECTION INTRAMUSCULAR; INTRAVENOUS ONCE
Status: COMPLETED | OUTPATIENT
Start: 2024-10-22 | End: 2024-10-22

## 2024-10-22 RX ORDER — DEXAMETHASONE SODIUM PHOSPHATE 10 MG/ML
12 INJECTION, SOLUTION INTRAMUSCULAR; INTRAVENOUS ONCE
Status: DISCONTINUED | OUTPATIENT
Start: 2024-10-22 | End: 2024-10-22

## 2024-10-22 RX ORDER — PALONOSETRON 0.05 MG/ML
0.25 INJECTION, SOLUTION INTRAVENOUS ONCE
Status: COMPLETED | OUTPATIENT
Start: 2024-10-22 | End: 2024-10-22

## 2024-10-22 RX ADMIN — DEXAMETHASONE SODIUM PHOSPHATE 12 MG: 4 INJECTION INTRA-ARTICULAR; INTRALESIONAL; INTRAMUSCULAR; INTRAVENOUS; SOFT TISSUE at 09:41

## 2024-10-22 RX ADMIN — PALONOSETRON HYDROCHLORIDE 0.25 MG: 0.25 INJECTION INTRAVENOUS at 09:25

## 2024-10-22 RX ADMIN — PACLITAXEL 299 MG: 6 INJECTION, SOLUTION INTRAVENOUS at 10:05

## 2024-10-22 RX ADMIN — DIPHENHYDRAMINE HYDROCHLORIDE 50 MG: 50 INJECTION, SOLUTION INTRAMUSCULAR; INTRAVENOUS at 09:33

## 2024-10-22 RX ADMIN — FAMOTIDINE 20 MG: 10 INJECTION, SOLUTION INTRAVENOUS at 09:36

## 2024-10-22 RX ADMIN — APREPITANT 130 MG: 130 INJECTION, EMULSION INTRAVENOUS at 09:27

## 2024-10-22 RX ADMIN — SODIUM CHLORIDE 250 ML: 9 INJECTION, SOLUTION INTRAVENOUS at 09:23

## 2024-10-22 RX ADMIN — CARBOPLATIN 700 MG: 10 INJECTION INTRAVENOUS at 13:22

## 2024-10-22 NOTE — PROGRESS NOTES
Infusion Nursing Note:  Estelita Waters presents today for C1D1 taxol/carboplatin.    Patient seen by provider today: No   present during visit today: Not Applicable.    Note: Oriented to the infusion area. Reviewed labs, all premeds and chemotherapy and answered appropriate questions. Patient asking questions regarding a port; discussed port with patient and  and she will think about it and call her doctor if she wants to proceed.      Intravenous Access:  Peripheral IV placed.    Treatment Conditions:  Lab Results   Component Value Date    HGB 12.4 10/22/2024    WBC 5.7 10/22/2024    ANEUTAUTO 2.0 10/22/2024     10/22/2024        Lab Results   Component Value Date     10/22/2024    POTASSIUM 4.0 10/22/2024    MAG 1.9 10/22/2024    CR 0.61 10/22/2024    REBECCA 10.0 10/22/2024    BILITOTAL 0.5 10/22/2024    ALBUMIN 4.3 10/22/2024    ALT 19 10/22/2024    AST 26 10/22/2024       Results reviewed, labs MET treatment parameters, ok to proceed with treatment.      Post Infusion Assessment:  Patient tolerated infusion without incident.  Blood return noted pre and post infusion.  Site patent and intact, free from redness, edema or discomfort.  No evidence of extravasations.  Access discontinued per protocol.       Discharge Plan:   AVS to patient via MYCHART.  Patient will return as prev hyun for next appointment.   Patient discharged in stable condition accompanied by: .  Departure Mode: Ambulatory.      Adams Wells RN

## 2024-11-07 NOTE — PROGRESS NOTES
Follow Up Notes on Referred Patient    Date: 10/9/2024       Dr. Gee referring provider defined for this encounter.       RE: Estelita Waters  : 1960  KAYLEIGH: 10/9/2024    Dear Dr. Gee ref. 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 She has been doing well since the surgery, no nausea, vomiting, fever, or chills. Normal urinary and bowel function, no vaginal bleeding or discharge.    Past Medical History:    Past Medical History:   Diagnosis Date    History of melanoma     Uterine cancer (H)          Past Surgical History:    Past Surgical History:   Procedure Laterality Date     SECTION      DAVINCI HYSTERECTOMY TOTAL, BILATERAL SALPINGO-OOPHORECTOMY, NODE DISSECTION, COMBINED Bilateral 2024    Procedure: HYSTERECTOMY, TOTAL, ROBOT-ASSISTED, WITH BILATERAL SALPINGO-OOPHORECTOMY, AND BILATERAL PELVIC sentinel sampling, omentectomy;  Surgeon: Wenceslao Burt MD;  Location: [facility] OR    HYSTERECTOMY, TOTAL, ROBOT-ASSISTED, WITH BILATERAL SALPINGO-OOPHORECTOMY, AND BILATERAL PELVIC sentinel sampling, omentectomy - Bilateral  2024    MOHS MICROGRAPHIC PROCEDURE      CO BREAST AUGMENTATION      WRIST SURGERY           Health Maintenance Due   Topic Date Due    ADVANCE CARE PLANNING  Never done    Pneumococcal Vaccine: Pediatrics (0 to 5 Years) and At-Risk Patients (6 to 64 Years) (1 of 2 - PCV) Never done    HIV SCREENING  Never done    HEPATITIS C SCREENING  Never done    PAP  Never done    LIPID  Never done    RSV VACCINE (1 - Risk 60-74 years 1-dose series) Never done    COVID-19 Vaccine ( season) 2024       Current Medications:     Current Outpatient Medications   Medication Sig Dispense Refill    dexAMETHasone (DECADRON) 4 MG tablet Take 2 tablets (8 mg) by mouth daily. Take for 3 days, starting the day after chemo. Take with food. 6 tablet 5    ondansetron (ZOFRAN) 8 MG tablet Take 1  tablet (8 mg) by mouth every 8 hours as needed for nausea (vomiting). Do not take for 3 days after chemo. 30 tablet 2    prochlorperazine (COMPAZINE) 10 MG tablet Take 1 tablet (10 mg) by mouth every 6 hours as needed for nausea or vomiting. 30 tablet 2    tretinoin (RETIN-A) 0.1 % external cream Apply 1 Application topically as needed.           Allergies:      No Known Allergies     Social History:     Social History     Tobacco Use    Smoking status: Never    Smokeless tobacco: Never   Substance Use Topics    Alcohol use: Yes     Alcohol/week: 4.0 standard drinks of alcohol     Types: 4 Glasses of wine per week       History   Drug Use Unknown         Family History:         Family History   Problem Relation Age of Onset    Pulmonary Embolism Mother         unknown cause    Deep Vein Thrombosis (DVT) Mother     Anesthesia Reaction No family hx of          Physical Exam:     BP (!) 151/78   Pulse 57   Temp 98.5  F (36.9  C)   Resp 16   Wt 61.7 kg (136 lb)   LMP  (LMP Unknown)   SpO2 100%   BMI 21.30 kg/m    Body mass index is 21.3 kg/m .    General Appearance: healthy and alert, no distress     Musculoskeletal: extremities non tender and without edema    Skin: no lesions or rashes     Neurological: normal gait, no gross defects     Psychiatric: appropriate mood and affect                               Hematological: normal cervical, supraclavicular and inguinal lymph nodes     Gastrointestinal:       abdomen soft, non-tender, non-distended, no organomegaly or masses, well healing port site incisions.          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     A total of 34 minutes was spent with the patient, chart documentation, coordination of care, documentation, in counseling the patient and/or treatment planning.      Stage IB high grade serous endometrial cancer  Family history of early onset cancer  Personal history of  Melanoma  HER2 + by fish (2+ IHC)       We discussed in detail the pathophysiology of endometrial cancer as well as molecular classification and treatment. Patient as well as her  agree with this plan they are very appreciative for care all questions were answered.    Wenceslao Burt MD, MS    Department of Obstetrics and Gynecology   Division of Gynecologic Oncology   [facility]  Phone: [phone]  CC  Patient Care Team:  Katie Centeno DO as PCP - General  Wenceslao Burt MD as MD (Gynecologic Oncology)  Shira Edward, APRN CNP as Assigned Cancer Care Provider

## 2024-11-11 NOTE — PROGRESS NOTES
Virtual Visit Details    Type of service:  Telephone Visit   Phone call duration: 17 minutes   Originating Location (pt. Location): Home    Distant Location (provider location):  On-site        Gynecologic Oncology Return Visit Note    Date: 2024     RE: Estelita Waters  : 1960  KAYLEIGH: 2024     CC: Stage IB serous endometrial cancer      HPI:  Estelita Waters is a 64 year old woman with a diagnosis of stage IB serous endometrial cancer.  She is here today for follow up and disease management by phone.      Oncology History:  Initially, she presented with PMB.     24: Pelvic US  IMPRESSION:   1. Circumscribed hyperechoic mass centered in the endometrium measuring up to   1.9 cm. Differential considerations include a submucosal fibroid, large   endometrial polyp and endometrial malignancy. Recommend tissue sampling for   further evaluation. Female pelvic MRI may provide additional diagnostic   information, could be considered for further evaluation.   2. Fibroid uterus.   3. No suspicious adnexal mass.      24: Diagnostic hysteroscopy, hysteroscopic myomectomy, dilation and curettage    A.  Endometrium, Endometrial curettings and fibroid, curettage:    High-grade carcinoma possibly arising in a polyp, favor serous,  pending POLE mutational status, see comment  Comment:  Stain Results Comments   P53 Negative, P53 ABNORMAL All performed on Block B4   P16 positive     WT1 positive     P53 shows completely absent staining of the epithelial component (abnormal) but wild type staining in the stromal elements (internal control).  MMR intact.   No mutations were detected in POLE.  The measured tumor mutation burden (TMB) is low in this sample.     24:  12.  CT CAP  IMPRESSION:  1.  No evidence of metastatic disease in the chest, abdomen, or  pelvis.     24: HYSTERECTOMY, TOTAL, ROBOT-ASSISTED, WITH BILATERAL SALPINGO-OOPHORECTOMY, AND BILATERAL PELVIC sentinel sampling,  "omentectomy   A. Lymph node, RIGHT para-aortic, excision:  - One lymph node, negative for carcinoma (0/1)  B. Lymph node, LEFT external iliac, sentinel, biopsy:  - One lymph node, negative for carcinoma (0/1)  C. Uterus, cervix, bilateral fallopian tubes and ovaries, total laparoscopic hysterectomy with bilateral salpingo-oophorectomy:  - SEROUS CARCINOMA OF THE ENDOMETRIUM (size: 6.1 cm)  - Carcinoma invades 8 mm into a 14 mm myometrium (57%)  - Myometrial leiomyomas (largest: 3.2 cm) with hyalinization and calcification  - Cervix with Nabothian cysts   - Bilateral ovaries with focal surface adhesions  - Bilateral fallopian tubes with paratubal cysts and focal surface adhesions  - See tumor synoptic report below  D. Omentum, omentectomy:  - Fibroadipose tissue, negative for malignancy  Washings negative.  HER2 +2         Plan: Carboplatin AUC 6 and paclitaxel 175 mg/m2 IV every 3 weeks x 6 cycles.    10/22/24: Cycle 1 carboplatin and paclitaxel.  11/13/24: Cycle 2 carboplatin and paclitaxel planned.          Today she reports;    -Nausea or vomiting: She did have some nausea, worse in the morning, needed a couple days of antiemetics-couldn't sleep while on decadron, wondering if she can stop it   -Appetite: Appetite was \"fine\", first night had some metallic taste that resolved, no difficulty eating or drinking  -Weight loss: No significant weight loss, maybe a pound that she attributes to resuming exercise  -Diarrhea/Constipation: Some constipation managed with maalox, senna one day  -Neuropathy symptoms: No  -Leg swelling: No  -Fevers: No  -Chest pain: No  -SOB: No  -Not a whole lot of fatigue  -Resumed running and lots of exercise  -Hair almost all gone, shaved last week but left about 1/4\"  -Hoping to schedule treatments in January, insurance is changing, will be on Medicare 3/1 but hoping to extend her existing insurance through Feb (currently just in effect in January)  -She did develop a mouth sore that " improved with salt/baking soda mouth rinses.            Past Medical History:    Past Medical History:   Diagnosis Date    History of melanoma     Uterine cancer (H)          Past Surgical History:    Past Surgical History:   Procedure Laterality Date     SECTION      DAVINCI HYSTERECTOMY TOTAL, BILATERAL SALPINGO-OOPHORECTOMY, NODE DISSECTION, COMBINED Bilateral 2024    Procedure: HYSTERECTOMY, TOTAL, ROBOT-ASSISTED, WITH BILATERAL SALPINGO-OOPHORECTOMY, AND BILATERAL PELVIC sentinel sampling, omentectomy;  Surgeon: Wenceslao Burt MD;  Location: [facility] OR    HYSTERECTOMY, TOTAL, ROBOT-ASSISTED, WITH BILATERAL SALPINGO-OOPHORECTOMY, AND BILATERAL PELVIC sentinel sampling, omentectomy - Bilateral  2024    MOHS MICROGRAPHIC PROCEDURE      MA BREAST AUGMENTATION      WRIST SURGERY           Health Maintenance Due   Topic Date Due    ADVANCE CARE PLANNING  Never done    Pneumococcal Vaccine: Pediatrics (0 to 5 Years) and At-Risk Patients (6 to 64 Years) (1 of 2 - PCV) Never done    HIV SCREENING  Never done    HEPATITIS C SCREENING  Never done    PAP  Never done    LIPID  Never done    RSV VACCINE (1 - Risk 60-74 years 1-dose series) Never done    COVID-19 Vaccine (2024- season) 2024       Current Medications:     Current Outpatient Medications   Medication Sig Dispense Refill    dexAMETHasone (DECADRON) 4 MG tablet Take 2 tablets (8 mg) by mouth daily. Take for 3 days, starting the day after chemo. Take with food. 6 tablet 5    ondansetron (ZOFRAN) 8 MG tablet Take 1 tablet (8 mg) by mouth every 8 hours as needed for nausea (vomiting). Do not take for 3 days after chemo. 30 tablet 2    prochlorperazine (COMPAZINE) 10 MG tablet Take 1 tablet (10 mg) by mouth every 6 hours as needed for nausea or vomiting. 30 tablet 2    tretinoin (RETIN-A) 0.1 % external cream Apply 1 Application topically as needed.           Allergies:      No Known Allergies     Social History:     Social History  "    Tobacco Use    Smoking status: Never    Smokeless tobacco: Never   Substance Use Topics    Alcohol use: Yes     Alcohol/week: 4.0 standard drinks of alcohol     Types: 4 Glasses of wine per week       History   Drug Use Unknown         Family History:     The patient's family history is notable for:    Family History   Problem Relation Age of Onset    Pulmonary Embolism Mother         unknown cause    Deep Vein Thrombosis (DVT) Mother     Anesthesia Reaction No family hx of          Physical Exam:     Ht 1.702 m (5' 7\")   Wt 59.4 kg (131 lb)   LMP  (LMP Unknown)   BMI 20.52 kg/m    Body mass index is 20.52 kg/m .    Respiratory: No audible wheeze, cough.      Psychiatric: appropriate mood and affect. Mentation appears normal, affect normal/bright, judgement and insight intact, normal speech       The rest of a comprehensive physical examination is deferred due phone visit restrictions.    No results found for this or any previous visit (from the past 24 hours).       Assessment:    Estelita Waters is a 64 year old woman with a diagnosis of stage IB serous endometrial cancer.  She is here today for follow up and disease management by phone.     16 minutes spent on the date of the encounter doing chart review, history and exam, documentation, and further activities as noted above.      Plan:     1.)        Endometrial cancer:  OK to proceed with planned treatment tomorrow if labs are WDL.  RTC in 3 weeks for labs, provider visit, and next cycle; this is already scheduled.  Plan for follow up with Dr. Burt after cycle 6.  If labs ok prior to treatment tomorrow will send request for remainder of treatment to be scheduled.  She is aware that this may change based on her blood counts.  Reviewed signs and symptoms for when she should contact the clinic or seek additional care.  Patient to contact the clinic with any questions or concerns in the interim.     Genetic risk factors were assessed and she is " scheduled for a new patient genetic counseling referral 5/12/25.     Labs and/or tests ordered include:  CBC. CMP. Mag                2.)        Health maintenance:  Issues addressed today include following up with PCP for annual health maintenance and non-gynecologic issues.     3.) Nausea: Some nausea mostly in the mornings for days 4 and 5 after treatment.  Improved with antiemetics.  No vomiting.  No decreased appetite.  No weight loss.  Discussed eating small meals in the mornings to decrease stomach acid.  Continue antiemetics as needed.  Okay to not take Decadron after treatment due to insomnia.  She has picked this up to have on hand if needed.    4.) Constipation: Slight constipation improved with OTC treatments.  Okay to continue senna as she has been.    5.) Mucositis: One mouth sore improved with salt/baking soda mouth rinses.  Discussed that sores are not very common with this regimen but are possible.  Okay to use salt/baking soda mouth rinses as needed.  If she begins noticing frequent mouth sores can use prophylactic basis.      Ivett Mike DNP, APRN, FNP-C, AOCNP  Oncology Nurse Practitioner  Division of Gynecologic Oncology  Pager: [phone]     CC  Patient Care Team:  Katie Centeno DO as PCP - General  Wenceslao Burt MD as MD (Gynecologic Oncology)  Ivett Mike, VANESA CNP as Assigned Cancer Care Provider  IVETT MIKE

## 2024-11-12 ENCOUNTER — VIRTUAL VISIT (OUTPATIENT)
Dept: ONCOLOGY | Facility: HOSPITAL | Age: 64
End: 2024-11-12
Attending: NURSE PRACTITIONER
Payer: COMMERCIAL

## 2024-11-12 VITALS — HEIGHT: 67 IN | BODY MASS INDEX: 20.56 KG/M2 | WEIGHT: 131 LBS

## 2024-11-12 DIAGNOSIS — Z51.11 ENCOUNTER FOR ANTINEOPLASTIC CHEMOTHERAPY: ICD-10-CM

## 2024-11-12 DIAGNOSIS — C54.1 ENDOMETRIAL CANCER (H): Primary | ICD-10-CM

## 2024-11-12 PROCEDURE — 99214 OFFICE O/P EST MOD 30 MIN: CPT | Mod: 24 | Performed by: NURSE PRACTITIONER

## 2024-11-12 RX ORDER — DIPHENHYDRAMINE HYDROCHLORIDE 50 MG/ML
50 INJECTION INTRAMUSCULAR; INTRAVENOUS
Status: CANCELLED
Start: 2024-11-15

## 2024-11-12 RX ORDER — HEPARIN SODIUM,PORCINE 10 UNIT/ML
5-20 VIAL (ML) INTRAVENOUS DAILY PRN
Status: CANCELLED | OUTPATIENT
Start: 2024-11-15

## 2024-11-12 RX ORDER — LORAZEPAM 2 MG/ML
0.5 INJECTION INTRAMUSCULAR EVERY 4 HOURS PRN
Status: CANCELLED | OUTPATIENT
Start: 2024-11-15

## 2024-11-12 RX ORDER — DIPHENHYDRAMINE HCL 50 MG
50 CAPSULE ORAL ONCE
Status: CANCELLED
Start: 2024-11-15

## 2024-11-12 RX ORDER — PALONOSETRON 0.05 MG/ML
0.25 INJECTION, SOLUTION INTRAVENOUS ONCE
Status: CANCELLED | OUTPATIENT
Start: 2024-11-15

## 2024-11-12 RX ORDER — DEXAMETHASONE SODIUM PHOSPHATE 10 MG/ML
12 INJECTION, SOLUTION INTRAMUSCULAR; INTRAVENOUS ONCE
Status: CANCELLED
Start: 2024-11-15 | End: 2024-11-15

## 2024-11-12 RX ORDER — ALBUTEROL SULFATE 0.83 MG/ML
2.5 SOLUTION RESPIRATORY (INHALATION)
Status: CANCELLED | OUTPATIENT
Start: 2024-11-15

## 2024-11-12 RX ORDER — MEPERIDINE HYDROCHLORIDE 50 MG/ML
25 INJECTION INTRAMUSCULAR; INTRAVENOUS; SUBCUTANEOUS
Status: CANCELLED | OUTPATIENT
Start: 2024-11-15

## 2024-11-12 RX ORDER — HEPARIN SODIUM (PORCINE) LOCK FLUSH IV SOLN 100 UNIT/ML 100 UNIT/ML
5 SOLUTION INTRAVENOUS
Status: CANCELLED | OUTPATIENT
Start: 2024-11-15

## 2024-11-12 RX ORDER — DIPHENHYDRAMINE HYDROCHLORIDE 50 MG/ML
25 INJECTION INTRAMUSCULAR; INTRAVENOUS
Status: CANCELLED
Start: 2024-11-15

## 2024-11-12 RX ORDER — ALBUTEROL SULFATE 90 UG/1
1-2 INHALANT RESPIRATORY (INHALATION)
Status: CANCELLED
Start: 2024-11-15

## 2024-11-12 RX ORDER — METHYLPREDNISOLONE SODIUM SUCCINATE 40 MG/ML
40 INJECTION INTRAMUSCULAR; INTRAVENOUS
Status: CANCELLED
Start: 2024-11-15

## 2024-11-12 RX ORDER — EPINEPHRINE 1 MG/ML
0.3 INJECTION, SOLUTION INTRAMUSCULAR; SUBCUTANEOUS EVERY 5 MIN PRN
Status: CANCELLED | OUTPATIENT
Start: 2024-11-15

## 2024-11-12 ASSESSMENT — PAIN SCALES - GENERAL: PAINLEVEL_OUTOF10: NO PAIN (0)

## 2024-11-12 NOTE — NURSING NOTE
Current patient location: [address] [place] [address] [address] [place] [place] [address]    Is the patient currently in the state of [place]? YES    Visit mode:TELEPHONE    If the visit is dropped, the patient can be reconnected by:TELEPHONE VISIT: Phone number:   Telephone Information:   Mobile [phone]       Will anyone else be joining the visit? NO  (If patient encounters technical issues they should call [phone])    Are changes needed to the allergy or medication list? No, Pt stated no changes to allergies, and Pt stated no med changes    Are refills needed on medications prescribed by this physician? NO    Rooming Documentation:  Questionnaire(s) completed    Reason for visit: RECHECK (Return CCSL)    Jeannette BLUE

## 2024-11-13 ENCOUNTER — LAB (OUTPATIENT)
Dept: INFUSION THERAPY | Facility: CLINIC | Age: 64
End: 2024-11-13
Attending: FAMILY MEDICINE
Payer: COMMERCIAL

## 2024-11-13 VITALS
SYSTOLIC BLOOD PRESSURE: 148 MMHG | HEART RATE: 48 BPM | DIASTOLIC BLOOD PRESSURE: 74 MMHG | OXYGEN SATURATION: 99 % | TEMPERATURE: 98 F | BODY MASS INDEX: 21.56 KG/M2 | RESPIRATION RATE: 16 BRPM | HEIGHT: 67 IN | WEIGHT: 137.4 LBS

## 2024-11-13 DIAGNOSIS — C54.1 ENDOMETRIAL CANCER (H): Primary | ICD-10-CM

## 2024-11-13 LAB
ALBUMIN SERPL BCG-MCNC: 4.4 G/DL (ref 3.5–5.2)
ALP SERPL-CCNC: 84 U/L (ref 40–150)
ALT SERPL W P-5'-P-CCNC: 22 U/L (ref 0–50)
ANION GAP SERPL CALCULATED.3IONS-SCNC: 7 MMOL/L (ref 7–15)
AST SERPL W P-5'-P-CCNC: 30 U/L (ref 0–45)
BASOPHILS # BLD AUTO: 0.1 10E3/UL (ref 0–0.2)
BASOPHILS NFR BLD AUTO: 2 %
BILIRUB SERPL-MCNC: 0.4 MG/DL
BUN SERPL-MCNC: 14.6 MG/DL (ref 8–23)
CALCIUM SERPL-MCNC: 9.9 MG/DL (ref 8.8–10.4)
CHLORIDE SERPL-SCNC: 100 MMOL/L (ref 98–107)
CREAT SERPL-MCNC: 0.67 MG/DL (ref 0.51–0.95)
EGFRCR SERPLBLD CKD-EPI 2021: >90 ML/MIN/1.73M2
EOSINOPHIL # BLD AUTO: 0.1 10E3/UL (ref 0–0.7)
EOSINOPHIL NFR BLD AUTO: 3 %
ERYTHROCYTE [DISTWIDTH] IN BLOOD BY AUTOMATED COUNT: 12.9 % (ref 10–15)
GLUCOSE SERPL-MCNC: 103 MG/DL (ref 70–99)
HCO3 SERPL-SCNC: 26 MMOL/L (ref 22–29)
HCT VFR BLD AUTO: 37.1 % (ref 35–47)
HGB BLD-MCNC: 12.5 G/DL (ref 11.7–15.7)
IMM GRANULOCYTES # BLD: 0 10E3/UL
IMM GRANULOCYTES NFR BLD: 0 %
LYMPHOCYTES # BLD AUTO: 1.7 10E3/UL (ref 0.8–5.3)
LYMPHOCYTES NFR BLD AUTO: 36 %
MAGNESIUM SERPL-MCNC: 1.9 MG/DL (ref 1.7–2.3)
MCH RBC QN AUTO: 30.8 PG (ref 26.5–33)
MCHC RBC AUTO-ENTMCNC: 33.7 G/DL (ref 31.5–36.5)
MCV RBC AUTO: 91 FL (ref 78–100)
MONOCYTES # BLD AUTO: 0.6 10E3/UL (ref 0–1.3)
MONOCYTES NFR BLD AUTO: 12 %
NEUTROPHILS # BLD AUTO: 2.2 10E3/UL (ref 1.6–8.3)
NEUTROPHILS NFR BLD AUTO: 47 %
NRBC # BLD AUTO: 0 10E3/UL
NRBC BLD AUTO-RTO: 0 /100
PLATELET # BLD AUTO: 269 10E3/UL (ref 150–450)
POTASSIUM SERPL-SCNC: 5.1 MMOL/L (ref 3.4–5.3)
PROT SERPL-MCNC: 7 G/DL (ref 6.4–8.3)
RBC # BLD AUTO: 4.06 10E6/UL (ref 3.8–5.2)
SODIUM SERPL-SCNC: 133 MMOL/L (ref 135–145)
WBC # BLD AUTO: 4.7 10E3/UL (ref 4–11)

## 2024-11-13 PROCEDURE — 80053 COMPREHEN METABOLIC PANEL: CPT | Performed by: NURSE PRACTITIONER

## 2024-11-13 PROCEDURE — 96375 TX/PRO/DX INJ NEW DRUG ADDON: CPT

## 2024-11-13 PROCEDURE — 258N000003 HC RX IP 258 OP 636: Performed by: NURSE PRACTITIONER

## 2024-11-13 PROCEDURE — 250N000013 HC RX MED GY IP 250 OP 250 PS 637: Performed by: NURSE PRACTITIONER

## 2024-11-13 PROCEDURE — 85014 HEMATOCRIT: CPT | Performed by: NURSE PRACTITIONER

## 2024-11-13 PROCEDURE — 250N000011 HC RX IP 250 OP 636: Performed by: NURSE PRACTITIONER

## 2024-11-13 PROCEDURE — 36415 COLL VENOUS BLD VENIPUNCTURE: CPT | Performed by: NURSE PRACTITIONER

## 2024-11-13 PROCEDURE — 85004 AUTOMATED DIFF WBC COUNT: CPT | Performed by: NURSE PRACTITIONER

## 2024-11-13 PROCEDURE — 96413 CHEMO IV INFUSION 1 HR: CPT

## 2024-11-13 PROCEDURE — 250N000011 HC RX IP 250 OP 636: Performed by: OBSTETRICS & GYNECOLOGY

## 2024-11-13 PROCEDURE — 258N000003 HC RX IP 258 OP 636: Performed by: OBSTETRICS & GYNECOLOGY

## 2024-11-13 PROCEDURE — 83735 ASSAY OF MAGNESIUM: CPT | Performed by: NURSE PRACTITIONER

## 2024-11-13 PROCEDURE — 96417 CHEMO IV INFUS EACH ADDL SEQ: CPT

## 2024-11-13 PROCEDURE — 96415 CHEMO IV INFUSION ADDL HR: CPT

## 2024-11-13 RX ORDER — DEXAMETHASONE SODIUM PHOSPHATE 4 MG/ML
12 INJECTION, SOLUTION INTRA-ARTICULAR; INTRALESIONAL; INTRAMUSCULAR; INTRAVENOUS; SOFT TISSUE ONCE
Status: COMPLETED | OUTPATIENT
Start: 2024-11-13 | End: 2024-11-13

## 2024-11-13 RX ORDER — PALONOSETRON 0.05 MG/ML
0.25 INJECTION, SOLUTION INTRAVENOUS ONCE
Status: COMPLETED | OUTPATIENT
Start: 2024-11-13 | End: 2024-11-13

## 2024-11-13 RX ORDER — DIPHENHYDRAMINE HCL 25 MG
50 CAPSULE ORAL ONCE
Status: COMPLETED | OUTPATIENT
Start: 2024-11-13 | End: 2024-11-13

## 2024-11-13 RX ORDER — DEXAMETHASONE SODIUM PHOSPHATE 10 MG/ML
12 INJECTION, SOLUTION INTRAMUSCULAR; INTRAVENOUS ONCE
Status: DISCONTINUED | OUTPATIENT
Start: 2024-11-13 | End: 2024-11-13

## 2024-11-13 RX ADMIN — APREPITANT 130 MG: 130 INJECTION, EMULSION INTRAVENOUS at 11:01

## 2024-11-13 RX ADMIN — FAMOTIDINE 20 MG: 10 INJECTION, SOLUTION INTRAVENOUS at 11:06

## 2024-11-13 RX ADMIN — SODIUM CHLORIDE 250 ML: 9 INJECTION, SOLUTION INTRAVENOUS at 10:59

## 2024-11-13 RX ADMIN — DEXAMETHASONE SODIUM PHOSPHATE 12 MG: 4 INJECTION INTRA-ARTICULAR; INTRALESIONAL; INTRAMUSCULAR; INTRAVENOUS; SOFT TISSUE at 11:09

## 2024-11-13 RX ADMIN — PACLITAXEL 299 MG: 6 INJECTION, SOLUTION INTRAVENOUS at 11:31

## 2024-11-13 RX ADMIN — DIPHENHYDRAMINE HYDROCHLORIDE 50 MG: 25 CAPSULE ORAL at 10:58

## 2024-11-13 RX ADMIN — CARBOPLATIN 650 MG: 10 INJECTION INTRAVENOUS at 14:41

## 2024-11-13 RX ADMIN — PALONOSETRON HYDROCHLORIDE 0.25 MG: 0.25 INJECTION INTRAVENOUS at 10:59

## 2024-11-13 NOTE — PROGRESS NOTES
Infusion Nursing Note:  Estelita Waters presents today for C2D1 Taxol/Carbo.    Patient seen by provider today: No.  Pt had VV yesterday with Shira.   present during visit today: Not Applicable.    Note: Patient reports feeling well today and tolerated her last treatment well.      Intravenous Access:  Peripheral IV placed.    Treatment Conditions:  Lab Results   Component Value Date    HGB 12.5 11/13/2024    WBC 4.7 11/13/2024    ANEUTAUTO 2.2 11/13/2024     11/13/2024        Lab Results   Component Value Date     (L) 11/13/2024    POTASSIUM 5.1 11/13/2024    MAG 1.9 11/13/2024    CR 0.67 11/13/2024    REBECCA 9.9 11/13/2024    BILITOTAL 0.4 11/13/2024    ALBUMIN 4.4 11/13/2024    ALT 22 11/13/2024    AST 30 11/13/2024       Results reviewed, labs MET treatment parameters, ok to proceed with treatment.      Post Infusion Assessment:  Patient tolerated infusion without incident.  Blood return noted pre and post infusion.  Blood return noted during administration prior to each drug.  Site patent and intact, free from redness, edema or discomfort.  No evidence of extravasations.  Access discontinued per protocol.       Discharge Plan:   Discharge instructions reviewed with: Patient and Family.  Patient and/or family verbalized understanding of discharge instructions and all questions answered.  AVS to patient via [employer].  Patient will return 12/2/24 for next appointment.   Patient discharged in stable condition accompanied by: .  Departure Mode: Ambulatory.      German Coronel RN

## 2024-11-13 NOTE — PROGRESS NOTES
Medical Assistant Note:  Estelita Waters presents today for lab draw.    Patient seen by provider today: No.   present during visit today: Not Applicable.    Concerns: No Concerns.    Procedure:  Lab draw site: LAC, Needle type: BF, Gauge: 21. Dimple and coban applied    Post Assessment:  Labs drawn without difficulty: Yes.    Discharge Plan:  Departure Mode: Ambulatory.    Face to Face Time: 5.    Kellen Baum CMA

## 2024-12-01 ENCOUNTER — INFUSION THERAPY VISIT (OUTPATIENT)
Dept: INFUSION THERAPY | Facility: CLINIC | Age: 64
End: 2024-12-01
Attending: FAMILY MEDICINE
Payer: COMMERCIAL

## 2024-12-01 DIAGNOSIS — C54.1 ENDOMETRIAL CANCER (H): Primary | ICD-10-CM

## 2024-12-01 LAB
ALBUMIN SERPL BCG-MCNC: 4.5 G/DL (ref 3.5–5.2)
ALP SERPL-CCNC: 84 U/L (ref 40–150)
ALT SERPL W P-5'-P-CCNC: 26 U/L (ref 0–50)
ANION GAP SERPL CALCULATED.3IONS-SCNC: 10 MMOL/L (ref 7–15)
AST SERPL W P-5'-P-CCNC: 37 U/L (ref 0–45)
BASOPHILS # BLD AUTO: 0 10E3/UL (ref 0–0.2)
BASOPHILS NFR BLD AUTO: 1 %
BILIRUB SERPL-MCNC: 0.4 MG/DL
BUN SERPL-MCNC: 15 MG/DL (ref 8–23)
CALCIUM SERPL-MCNC: 9.7 MG/DL (ref 8.8–10.4)
CHLORIDE SERPL-SCNC: 101 MMOL/L (ref 98–107)
CREAT SERPL-MCNC: 0.62 MG/DL (ref 0.51–0.95)
EGFRCR SERPLBLD CKD-EPI 2021: >90 ML/MIN/1.73M2
EOSINOPHIL # BLD AUTO: 0.1 10E3/UL (ref 0–0.7)
EOSINOPHIL NFR BLD AUTO: 3 %
ERYTHROCYTE [DISTWIDTH] IN BLOOD BY AUTOMATED COUNT: 14 % (ref 10–15)
GLUCOSE SERPL-MCNC: 92 MG/DL (ref 70–99)
HCO3 SERPL-SCNC: 27 MMOL/L (ref 22–29)
HCT VFR BLD AUTO: 34.5 % (ref 35–47)
HGB BLD-MCNC: 11.9 G/DL (ref 11.7–15.7)
IMM GRANULOCYTES # BLD: 0 10E3/UL
IMM GRANULOCYTES NFR BLD: 0 %
LYMPHOCYTES # BLD AUTO: 1.6 10E3/UL (ref 0.8–5.3)
LYMPHOCYTES NFR BLD AUTO: 50 %
MAGNESIUM SERPL-MCNC: 1.9 MG/DL (ref 1.7–2.3)
MCH RBC QN AUTO: 31.9 PG (ref 26.5–33)
MCHC RBC AUTO-ENTMCNC: 34.5 G/DL (ref 31.5–36.5)
MCV RBC AUTO: 93 FL (ref 78–100)
MONOCYTES # BLD AUTO: 0.5 10E3/UL (ref 0–1.3)
MONOCYTES NFR BLD AUTO: 16 %
NEUTROPHILS # BLD AUTO: 0.9 10E3/UL (ref 1.6–8.3)
NEUTROPHILS NFR BLD AUTO: 29 %
NRBC # BLD AUTO: 0 10E3/UL
NRBC BLD AUTO-RTO: 0 /100
PLATELET # BLD AUTO: 263 10E3/UL (ref 150–450)
POTASSIUM SERPL-SCNC: 4.1 MMOL/L (ref 3.4–5.3)
PROT SERPL-MCNC: 7.1 G/DL (ref 6.4–8.3)
RBC # BLD AUTO: 3.73 10E6/UL (ref 3.8–5.2)
SODIUM SERPL-SCNC: 138 MMOL/L (ref 135–145)
WBC # BLD AUTO: 3.1 10E3/UL (ref 4–11)

## 2024-12-01 PROCEDURE — 85025 COMPLETE CBC W/AUTO DIFF WBC: CPT | Performed by: NURSE PRACTITIONER

## 2024-12-01 PROCEDURE — 36415 COLL VENOUS BLD VENIPUNCTURE: CPT | Performed by: NURSE PRACTITIONER

## 2024-12-01 PROCEDURE — 84155 ASSAY OF PROTEIN SERUM: CPT | Performed by: NURSE PRACTITIONER

## 2024-12-01 PROCEDURE — 82040 ASSAY OF SERUM ALBUMIN: CPT | Performed by: NURSE PRACTITIONER

## 2024-12-01 PROCEDURE — 83735 ASSAY OF MAGNESIUM: CPT | Performed by: NURSE PRACTITIONER

## 2024-12-01 NOTE — PROGRESS NOTES
Infusion Nursing Note:  Estelita Waters presents today for port labs-drawn peripherally per patient request.    Patient seen by provider today: No   present during visit today: Not Applicable.    Note: N/A.    Intravenous Access:  Lab draw site left AC, Needle type butterfly, Gauge 23.  Labs drawn without difficulty.    Treatment Conditions:  Not Applicable. Labs pending at time of discharge.    Post Infusion Assessment:  Site patent and intact, free from redness, edema or discomfort.  No evidence of extravasations.  Access discontinued per protocol.     Discharge Plan:   Patient discharged in stable condition accompanied by: self.  Departure Mode: Ambulatory.      Rosie Caro RN

## 2024-12-03 ENCOUNTER — INFUSION THERAPY VISIT (OUTPATIENT)
Dept: INFUSION THERAPY | Facility: CLINIC | Age: 64
End: 2024-12-03
Attending: FAMILY MEDICINE
Payer: COMMERCIAL

## 2024-12-03 DIAGNOSIS — C54.1 ENDOMETRIAL CANCER (H): Primary | ICD-10-CM

## 2024-12-03 LAB
ALBUMIN SERPL BCG-MCNC: 4.5 G/DL (ref 3.5–5.2)
ALP SERPL-CCNC: 87 U/L (ref 40–150)
ALT SERPL W P-5'-P-CCNC: 24 U/L (ref 0–50)
ANION GAP SERPL CALCULATED.3IONS-SCNC: 8 MMOL/L (ref 7–15)
AST SERPL W P-5'-P-CCNC: 30 U/L (ref 0–45)
BASOPHILS # BLD AUTO: 0.1 10E3/UL (ref 0–0.2)
BASOPHILS NFR BLD AUTO: 2 %
BILIRUB SERPL-MCNC: 0.3 MG/DL
BUN SERPL-MCNC: 16.3 MG/DL (ref 8–23)
CALCIUM SERPL-MCNC: 9.8 MG/DL (ref 8.8–10.4)
CHLORIDE SERPL-SCNC: 105 MMOL/L (ref 98–107)
CREAT SERPL-MCNC: 0.67 MG/DL (ref 0.51–0.95)
EGFRCR SERPLBLD CKD-EPI 2021: >90 ML/MIN/1.73M2
EOSINOPHIL # BLD AUTO: 0.1 10E3/UL (ref 0–0.7)
EOSINOPHIL NFR BLD AUTO: 2 %
ERYTHROCYTE [DISTWIDTH] IN BLOOD BY AUTOMATED COUNT: 14.2 % (ref 10–15)
GLUCOSE SERPL-MCNC: 93 MG/DL (ref 70–99)
HCO3 SERPL-SCNC: 27 MMOL/L (ref 22–29)
HCT VFR BLD AUTO: 36.2 % (ref 35–47)
HGB BLD-MCNC: 12.2 G/DL (ref 11.7–15.7)
IMM GRANULOCYTES # BLD: 0 10E3/UL
IMM GRANULOCYTES NFR BLD: 1 %
LYMPHOCYTES # BLD AUTO: 1.9 10E3/UL (ref 0.8–5.3)
LYMPHOCYTES NFR BLD AUTO: 45 %
MAGNESIUM SERPL-MCNC: 2 MG/DL (ref 1.7–2.3)
MCH RBC QN AUTO: 31.3 PG (ref 26.5–33)
MCHC RBC AUTO-ENTMCNC: 33.7 G/DL (ref 31.5–36.5)
MCV RBC AUTO: 93 FL (ref 78–100)
MONOCYTES # BLD AUTO: 0.6 10E3/UL (ref 0–1.3)
MONOCYTES NFR BLD AUTO: 13 %
NEUTROPHILS # BLD AUTO: 1.6 10E3/UL (ref 1.6–8.3)
NEUTROPHILS NFR BLD AUTO: 38 %
NRBC # BLD AUTO: 0 10E3/UL
NRBC BLD AUTO-RTO: 0 /100
PLATELET # BLD AUTO: 277 10E3/UL (ref 150–450)
POTASSIUM SERPL-SCNC: 4.4 MMOL/L (ref 3.4–5.3)
PROT SERPL-MCNC: 7.3 G/DL (ref 6.4–8.3)
RBC # BLD AUTO: 3.9 10E6/UL (ref 3.8–5.2)
SODIUM SERPL-SCNC: 140 MMOL/L (ref 135–145)
WBC # BLD AUTO: 4.2 10E3/UL (ref 4–11)

## 2024-12-03 PROCEDURE — 80048 BASIC METABOLIC PNL TOTAL CA: CPT | Performed by: NURSE PRACTITIONER

## 2024-12-03 PROCEDURE — 85041 AUTOMATED RBC COUNT: CPT | Performed by: NURSE PRACTITIONER

## 2024-12-03 PROCEDURE — 36415 COLL VENOUS BLD VENIPUNCTURE: CPT | Performed by: NURSE PRACTITIONER

## 2024-12-03 PROCEDURE — 85004 AUTOMATED DIFF WBC COUNT: CPT | Performed by: NURSE PRACTITIONER

## 2024-12-03 PROCEDURE — 82040 ASSAY OF SERUM ALBUMIN: CPT | Performed by: NURSE PRACTITIONER

## 2024-12-03 PROCEDURE — 83735 ASSAY OF MAGNESIUM: CPT | Performed by: NURSE PRACTITIONER

## 2024-12-03 NOTE — PROGRESS NOTES
Nursing Note:  Estelita Waters presents today for labs.    Patient seen by provider today: No   present during visit today: Not Applicable.    Note: N/A.    Intravenous Access:  Lab draw site LAC, Needle type butterfly, Gauge 23.  Labs drawn without difficulty.    Discharge Plan:   Patient was sent to home for tomorrow appointment.    Otilia White RN

## 2024-12-04 ENCOUNTER — INFUSION THERAPY VISIT (OUTPATIENT)
Dept: INFUSION THERAPY | Facility: CLINIC | Age: 64
End: 2024-12-04
Attending: OBSTETRICS & GYNECOLOGY
Payer: COMMERCIAL

## 2024-12-04 VITALS
DIASTOLIC BLOOD PRESSURE: 75 MMHG | SYSTOLIC BLOOD PRESSURE: 130 MMHG | BODY MASS INDEX: 21.42 KG/M2 | RESPIRATION RATE: 20 BRPM | WEIGHT: 136.8 LBS | TEMPERATURE: 98.4 F | OXYGEN SATURATION: 97 % | HEART RATE: 70 BPM

## 2024-12-04 DIAGNOSIS — C54.1 ENDOMETRIAL CANCER (H): Primary | ICD-10-CM

## 2024-12-04 PROCEDURE — 96417 CHEMO IV INFUS EACH ADDL SEQ: CPT

## 2024-12-04 PROCEDURE — 250N000011 HC RX IP 250 OP 636: Performed by: NURSE PRACTITIONER

## 2024-12-04 PROCEDURE — 96415 CHEMO IV INFUSION ADDL HR: CPT

## 2024-12-04 PROCEDURE — 96413 CHEMO IV INFUSION 1 HR: CPT

## 2024-12-04 PROCEDURE — 250N000013 HC RX MED GY IP 250 OP 250 PS 637: Performed by: NURSE PRACTITIONER

## 2024-12-04 PROCEDURE — 96375 TX/PRO/DX INJ NEW DRUG ADDON: CPT

## 2024-12-04 PROCEDURE — 258N000003 HC RX IP 258 OP 636: Performed by: NURSE PRACTITIONER

## 2024-12-04 RX ORDER — PALONOSETRON 0.05 MG/ML
0.25 INJECTION, SOLUTION INTRAVENOUS ONCE
Status: COMPLETED | OUTPATIENT
Start: 2024-12-04 | End: 2024-12-04

## 2024-12-04 RX ORDER — HEPARIN SODIUM (PORCINE) LOCK FLUSH IV SOLN 100 UNIT/ML 100 UNIT/ML
5 SOLUTION INTRAVENOUS
Status: DISCONTINUED | OUTPATIENT
Start: 2024-12-04 | End: 2024-12-04 | Stop reason: HOSPADM

## 2024-12-04 RX ORDER — DIPHENHYDRAMINE HCL 25 MG
50 CAPSULE ORAL ONCE
Status: COMPLETED | OUTPATIENT
Start: 2024-12-04 | End: 2024-12-04

## 2024-12-04 RX ORDER — DEXAMETHASONE SODIUM PHOSPHATE 4 MG/ML
12 INJECTION, SOLUTION INTRA-ARTICULAR; INTRALESIONAL; INTRAMUSCULAR; INTRAVENOUS; SOFT TISSUE ONCE
Status: COMPLETED | OUTPATIENT
Start: 2024-12-04 | End: 2024-12-04

## 2024-12-04 RX ADMIN — SODIUM CHLORIDE 250 ML: 9 INJECTION, SOLUTION INTRAVENOUS at 07:53

## 2024-12-04 RX ADMIN — CARBOPLATIN 650 MG: 10 INJECTION, SOLUTION INTRAVENOUS at 11:48

## 2024-12-04 RX ADMIN — DIPHENHYDRAMINE HYDROCHLORIDE 50 MG: 25 CAPSULE ORAL at 07:53

## 2024-12-04 RX ADMIN — FAMOTIDINE 20 MG: 10 INJECTION, SOLUTION INTRAVENOUS at 08:21

## 2024-12-04 RX ADMIN — DEXAMETHASONE SODIUM PHOSPHATE 12 MG: 4 INJECTION INTRA-ARTICULAR; INTRALESIONAL; INTRAMUSCULAR; INTRAVENOUS; SOFT TISSUE at 07:55

## 2024-12-04 RX ADMIN — APREPITANT 130 MG: 130 INJECTION, EMULSION INTRAVENOUS at 08:12

## 2024-12-04 RX ADMIN — PALONOSETRON HYDROCHLORIDE 0.25 MG: 0.25 INJECTION INTRAVENOUS at 08:18

## 2024-12-04 RX ADMIN — PACLITAXEL 299 MG: 6 INJECTION, SOLUTION INTRAVENOUS at 08:47

## 2024-12-04 NOTE — PROGRESS NOTES
Infusion Nursing Note:  Estelita Waters presents today for Cycle 3 Day 1 Taxol, Carboplatin.    Patient seen by provider today: No   present during visit today: Not Applicable.    Note: N/A.      Intravenous Access:  Implanted Port.    Treatment Conditions:  Lab Results   Component Value Date    HGB 12.2 12/03/2024    WBC 4.2 12/03/2024    ANEUTAUTO 1.6 12/03/2024     12/03/2024        Lab Results   Component Value Date     12/03/2024    POTASSIUM 4.4 12/03/2024    MAG 2.0 12/03/2024    CR 0.67 12/03/2024    REBECCA 9.8 12/03/2024    BILITOTAL 0.3 12/03/2024    ALBUMIN 4.5 12/03/2024    ALT 24 12/03/2024    AST 30 12/03/2024       Results reviewed, labs MET treatment parameters, ok to proceed with treatment.      Post Infusion Assessment:  Patient tolerated infusion without incident.  Blood return noted pre and post infusion.  Site patent and intact, free from redness, edema or discomfort.  No evidence of extravasations.  Access discontinued per protocol.       Discharge Plan:   Patient declined prescription refills.  Discharge instructions reviewed with: Patient.  Patient verbalized understanding of discharge instructions and all questions answered.  AVS to patient via [employer].  Patient will return 12/23/24 for next appointment.   Patient discharged in stable condition accompanied by: self.  Departure Mode: Ambulatory.      Aaliyah Tidwell RN

## 2024-12-23 ENCOUNTER — LAB (OUTPATIENT)
Dept: INFUSION THERAPY | Facility: CLINIC | Age: 64
End: 2024-12-23
Attending: OBSTETRICS & GYNECOLOGY
Payer: COMMERCIAL

## 2024-12-23 DIAGNOSIS — T45.1X5A CHEMOTHERAPY-INDUCED NEUTROPENIA (H): Primary | ICD-10-CM

## 2024-12-23 DIAGNOSIS — C54.1 ENDOMETRIAL CANCER (H): Primary | ICD-10-CM

## 2024-12-23 DIAGNOSIS — D70.1 CHEMOTHERAPY-INDUCED NEUTROPENIA (H): Primary | ICD-10-CM

## 2024-12-23 LAB
ALBUMIN SERPL BCG-MCNC: 4.3 G/DL (ref 3.5–5.2)
ALP SERPL-CCNC: 72 U/L (ref 40–150)
ALT SERPL W P-5'-P-CCNC: 24 U/L (ref 0–50)
ANION GAP SERPL CALCULATED.3IONS-SCNC: 9 MMOL/L (ref 7–15)
AST SERPL W P-5'-P-CCNC: 31 U/L (ref 0–45)
BASOPHILS # BLD AUTO: 0.1 10E3/UL (ref 0–0.2)
BASOPHILS NFR BLD AUTO: 2 %
BILIRUB SERPL-MCNC: 0.2 MG/DL
BUN SERPL-MCNC: 16.9 MG/DL (ref 8–23)
CALCIUM SERPL-MCNC: 9.7 MG/DL (ref 8.8–10.4)
CHLORIDE SERPL-SCNC: 104 MMOL/L (ref 98–107)
CREAT SERPL-MCNC: 0.65 MG/DL (ref 0.51–0.95)
EGFRCR SERPLBLD CKD-EPI 2021: >90 ML/MIN/1.73M2
EOSINOPHIL # BLD AUTO: 0.1 10E3/UL (ref 0–0.7)
EOSINOPHIL NFR BLD AUTO: 2 %
ERYTHROCYTE [DISTWIDTH] IN BLOOD BY AUTOMATED COUNT: 14.3 % (ref 10–15)
GLUCOSE SERPL-MCNC: 96 MG/DL (ref 70–99)
HCO3 SERPL-SCNC: 26 MMOL/L (ref 22–29)
HCT VFR BLD AUTO: 31.7 % (ref 35–47)
HGB BLD-MCNC: 10.8 G/DL (ref 11.7–15.7)
IMM GRANULOCYTES # BLD: 0 10E3/UL
IMM GRANULOCYTES NFR BLD: 0 %
LYMPHOCYTES # BLD AUTO: 1.4 10E3/UL (ref 0.8–5.3)
LYMPHOCYTES NFR BLD AUTO: 47 %
MAGNESIUM SERPL-MCNC: 1.9 MG/DL (ref 1.7–2.3)
MCH RBC QN AUTO: 31.9 PG (ref 26.5–33)
MCHC RBC AUTO-ENTMCNC: 34.1 G/DL (ref 31.5–36.5)
MCV RBC AUTO: 94 FL (ref 78–100)
MONOCYTES # BLD AUTO: 0.5 10E3/UL (ref 0–1.3)
MONOCYTES NFR BLD AUTO: 17 %
NEUTROPHILS # BLD AUTO: 1 10E3/UL (ref 1.6–8.3)
NEUTROPHILS NFR BLD AUTO: 32 %
NRBC # BLD AUTO: 0 10E3/UL
NRBC BLD AUTO-RTO: 0 /100
PLATELET # BLD AUTO: 276 10E3/UL (ref 150–450)
POTASSIUM SERPL-SCNC: 4.5 MMOL/L (ref 3.4–5.3)
PROT SERPL-MCNC: 6.6 G/DL (ref 6.4–8.3)
RBC # BLD AUTO: 3.39 10E6/UL (ref 3.8–5.2)
SODIUM SERPL-SCNC: 139 MMOL/L (ref 135–145)
WBC # BLD AUTO: 3 10E3/UL (ref 4–11)

## 2024-12-23 PROCEDURE — 80053 COMPREHEN METABOLIC PANEL: CPT | Performed by: NURSE PRACTITIONER

## 2024-12-23 PROCEDURE — 85049 AUTOMATED PLATELET COUNT: CPT | Performed by: NURSE PRACTITIONER

## 2024-12-23 PROCEDURE — 83735 ASSAY OF MAGNESIUM: CPT | Performed by: NURSE PRACTITIONER

## 2024-12-23 PROCEDURE — 36415 COLL VENOUS BLD VENIPUNCTURE: CPT | Performed by: NURSE PRACTITIONER

## 2024-12-23 PROCEDURE — 85004 AUTOMATED DIFF WBC COUNT: CPT | Performed by: NURSE PRACTITIONER

## 2024-12-23 NOTE — PROGRESS NOTES
Medical Assistant Note:  Estelita CARMELO Waters presents today for blood draw.    Patient seen by provider today: No.   present during visit today: Not Applicable.    Concerns: No Concerns.    Procedure:  Lab draw site: lac, Needle type: bf, Gauge: 23.    Post Assessment:  Labs drawn without difficulty: Yes.    Discharge Plan:  Departure Mode: Ambulatory.    Face to Face Time: 5 min.    Karissa Morfin, CMA

## 2024-12-30 DIAGNOSIS — C54.1 ENDOMETRIAL CANCER (H): Primary | ICD-10-CM

## 2024-12-30 DIAGNOSIS — T45.1X5A CHEMOTHERAPY-INDUCED NEUTROPENIA (H): ICD-10-CM

## 2024-12-30 DIAGNOSIS — D70.1 CHEMOTHERAPY-INDUCED NEUTROPENIA (H): ICD-10-CM

## 2024-12-31 ENCOUNTER — INFUSION THERAPY VISIT (OUTPATIENT)
Dept: INFUSION THERAPY | Facility: CLINIC | Age: 64
End: 2024-12-31
Attending: OBSTETRICS & GYNECOLOGY
Payer: COMMERCIAL

## 2024-12-31 VITALS
HEART RATE: 48 BPM | SYSTOLIC BLOOD PRESSURE: 96 MMHG | HEIGHT: 67 IN | RESPIRATION RATE: 18 BRPM | TEMPERATURE: 98 F | BODY MASS INDEX: 21.53 KG/M2 | OXYGEN SATURATION: 98 % | DIASTOLIC BLOOD PRESSURE: 62 MMHG | WEIGHT: 137.2 LBS

## 2024-12-31 DIAGNOSIS — C54.1 ENDOMETRIAL CANCER (H): ICD-10-CM

## 2024-12-31 DIAGNOSIS — D70.1 CHEMOTHERAPY-INDUCED NEUTROPENIA (H): Primary | ICD-10-CM

## 2024-12-31 DIAGNOSIS — T45.1X5A CHEMOTHERAPY-INDUCED NEUTROPENIA (H): Primary | ICD-10-CM

## 2024-12-31 LAB
ALBUMIN SERPL BCG-MCNC: 4.6 G/DL (ref 3.5–5.2)
ALP SERPL-CCNC: 78 U/L (ref 40–150)
ALT SERPL W P-5'-P-CCNC: 30 U/L (ref 0–50)
ANION GAP SERPL CALCULATED.3IONS-SCNC: 10 MMOL/L (ref 7–15)
AST SERPL W P-5'-P-CCNC: ABNORMAL U/L
BASOPHILS # BLD AUTO: 0.1 10E3/UL (ref 0–0.2)
BASOPHILS NFR BLD AUTO: 2 %
BILIRUB SERPL-MCNC: 0.4 MG/DL
BUN SERPL-MCNC: 18.1 MG/DL (ref 8–23)
CALCIUM SERPL-MCNC: 10.2 MG/DL (ref 8.8–10.4)
CHLORIDE SERPL-SCNC: 103 MMOL/L (ref 98–107)
CREAT SERPL-MCNC: 0.64 MG/DL (ref 0.51–0.95)
EGFRCR SERPLBLD CKD-EPI 2021: >90 ML/MIN/1.73M2
EOSINOPHIL # BLD AUTO: 0.1 10E3/UL (ref 0–0.7)
EOSINOPHIL NFR BLD AUTO: 2 %
ERYTHROCYTE [DISTWIDTH] IN BLOOD BY AUTOMATED COUNT: 14.1 % (ref 10–15)
GLUCOSE SERPL-MCNC: 100 MG/DL (ref 70–99)
HCO3 SERPL-SCNC: 26 MMOL/L (ref 22–29)
HCT VFR BLD AUTO: 35.9 % (ref 35–47)
HGB BLD-MCNC: 12.4 G/DL (ref 11.7–15.7)
IMM GRANULOCYTES # BLD: 0 10E3/UL
IMM GRANULOCYTES NFR BLD: 0 %
LYMPHOCYTES # BLD AUTO: 1.6 10E3/UL (ref 0.8–5.3)
LYMPHOCYTES NFR BLD AUTO: 30 %
MAGNESIUM SERPL-MCNC: 2 MG/DL (ref 1.7–2.3)
MCH RBC QN AUTO: 32.1 PG (ref 26.5–33)
MCHC RBC AUTO-ENTMCNC: 34.5 G/DL (ref 31.5–36.5)
MCV RBC AUTO: 93 FL (ref 78–100)
MONOCYTES # BLD AUTO: 0.5 10E3/UL (ref 0–1.3)
MONOCYTES NFR BLD AUTO: 9 %
NEUTROPHILS # BLD AUTO: 3 10E3/UL (ref 1.6–8.3)
NEUTROPHILS NFR BLD AUTO: 57 %
NRBC # BLD AUTO: 0 10E3/UL
NRBC BLD AUTO-RTO: 0 /100
PLATELET # BLD AUTO: 276 10E3/UL (ref 150–450)
POTASSIUM SERPL-SCNC: 4.5 MMOL/L (ref 3.4–5.3)
PROT SERPL-MCNC: 7.3 G/DL (ref 6.4–8.3)
RBC # BLD AUTO: 3.86 10E6/UL (ref 3.8–5.2)
SODIUM SERPL-SCNC: 139 MMOL/L (ref 135–145)
WBC # BLD AUTO: 5.2 10E3/UL (ref 4–11)

## 2024-12-31 PROCEDURE — 96417 CHEMO IV INFUS EACH ADDL SEQ: CPT

## 2024-12-31 PROCEDURE — 36415 COLL VENOUS BLD VENIPUNCTURE: CPT | Performed by: NURSE PRACTITIONER

## 2024-12-31 PROCEDURE — 83735 ASSAY OF MAGNESIUM: CPT | Performed by: NURSE PRACTITIONER

## 2024-12-31 PROCEDURE — 258N000003 HC RX IP 258 OP 636: Performed by: NURSE PRACTITIONER

## 2024-12-31 PROCEDURE — 250N000013 HC RX MED GY IP 250 OP 250 PS 637: Performed by: NURSE PRACTITIONER

## 2024-12-31 PROCEDURE — 85018 HEMOGLOBIN: CPT | Performed by: NURSE PRACTITIONER

## 2024-12-31 PROCEDURE — 84132 ASSAY OF SERUM POTASSIUM: CPT | Performed by: NURSE PRACTITIONER

## 2024-12-31 PROCEDURE — 85004 AUTOMATED DIFF WBC COUNT: CPT | Performed by: NURSE PRACTITIONER

## 2024-12-31 PROCEDURE — 84155 ASSAY OF PROTEIN SERUM: CPT | Performed by: NURSE PRACTITIONER

## 2024-12-31 PROCEDURE — 96413 CHEMO IV INFUSION 1 HR: CPT

## 2024-12-31 PROCEDURE — 96375 TX/PRO/DX INJ NEW DRUG ADDON: CPT

## 2024-12-31 PROCEDURE — 250N000011 HC RX IP 250 OP 636: Performed by: NURSE PRACTITIONER

## 2024-12-31 PROCEDURE — 96415 CHEMO IV INFUSION ADDL HR: CPT

## 2024-12-31 RX ORDER — METHYLPREDNISOLONE SODIUM SUCCINATE 40 MG/ML
40 INJECTION INTRAMUSCULAR; INTRAVENOUS
Status: COMPLETED | OUTPATIENT
Start: 2024-12-31 | End: 2024-12-31

## 2024-12-31 RX ORDER — DIPHENHYDRAMINE HCL 25 MG
50 CAPSULE ORAL ONCE
Status: COMPLETED | OUTPATIENT
Start: 2024-12-31 | End: 2024-12-31

## 2024-12-31 RX ORDER — DEXAMETHASONE SODIUM PHOSPHATE 4 MG/ML
12 INJECTION, SOLUTION INTRA-ARTICULAR; INTRALESIONAL; INTRAMUSCULAR; INTRAVENOUS; SOFT TISSUE ONCE
Status: COMPLETED | OUTPATIENT
Start: 2024-12-31 | End: 2024-12-31

## 2024-12-31 RX ORDER — PALONOSETRON 0.05 MG/ML
0.25 INJECTION, SOLUTION INTRAVENOUS ONCE
Status: COMPLETED | OUTPATIENT
Start: 2024-12-31 | End: 2024-12-31

## 2024-12-31 RX ORDER — BIMATOPROST 3 UG/ML
1 SOLUTION TOPICAL AT BEDTIME
COMMUNITY

## 2024-12-31 RX ORDER — DIPHENHYDRAMINE HYDROCHLORIDE 50 MG/ML
25 INJECTION INTRAMUSCULAR; INTRAVENOUS
Status: COMPLETED | OUTPATIENT
Start: 2024-12-31 | End: 2024-12-31

## 2024-12-31 RX ADMIN — PACLITAXEL 299 MG: 6 INJECTION, SOLUTION INTRAVENOUS at 09:15

## 2024-12-31 RX ADMIN — APREPITANT 130 MG: 130 INJECTION, EMULSION INTRAVENOUS at 08:43

## 2024-12-31 RX ADMIN — CARBOPLATIN 650 MG: 10 INJECTION, SOLUTION INTRAVENOUS at 13:31

## 2024-12-31 RX ADMIN — DIPHENHYDRAMINE HYDROCHLORIDE 25 MG: 50 INJECTION INTRAMUSCULAR; INTRAVENOUS at 09:41

## 2024-12-31 RX ADMIN — DIPHENHYDRAMINE HYDROCHLORIDE 50 MG: 25 CAPSULE ORAL at 08:41

## 2024-12-31 RX ADMIN — PALONOSETRON HYDROCHLORIDE 0.25 MG: 0.25 INJECTION INTRAVENOUS at 08:57

## 2024-12-31 RX ADMIN — FAMOTIDINE 20 MG: 10 INJECTION, SOLUTION INTRAVENOUS at 09:00

## 2024-12-31 RX ADMIN — DEXAMETHASONE SODIUM PHOSPHATE 12 MG: 4 INJECTION INTRA-ARTICULAR; INTRALESIONAL; INTRAMUSCULAR; INTRAVENOUS; SOFT TISSUE at 08:51

## 2024-12-31 RX ADMIN — SODIUM CHLORIDE 250 ML: 9 INJECTION, SOLUTION INTRAVENOUS at 08:43

## 2024-12-31 RX ADMIN — METHYLPREDNISOLONE SODIUM SUCCINATE 40 MG: 40 INJECTION, POWDER, FOR SOLUTION INTRAMUSCULAR; INTRAVENOUS at 09:45

## 2024-12-31 NOTE — PROGRESS NOTES
"Infusion Nursing Note:  Estelita Waters presents today for D1C4 Carboplatin and Paclitaxol.    Patient seen by provider today: No   present during visit today: Not Applicable.    Note: Pt presents for carboplatin and paclitaxol.  20 minutes in to the Paclitaxol infusion patient began to experience what she described as a \"Vasovagal\" lightheadedness.  She also felt slightly nauseated and had the feeling that she needed to have a bowel movement suddenly.  Blood pressure was 97/60, HR 47, Pt is bradycardic at baseline.   Infusion was running at rate of 199mL/hr and was subsequently stopped with administration of 25mg Benadryl and 40 mg of Solu-medrol at which time symptoms resolved.  Provider Jamshid Bear NP and Shara Esqueda NP were consulted and evaluated patient.  Jamshid advised patient  be restarted on paclitaxol infusion 30 minutes after resolution of symptoms.  Pt was restarted at 100mL /hr for 15 minutes without incident and therefore increased to full rate of 199mL/hr and tolerated well.  Shira Edward CNP was contacted regarding this reaction, no changes at this time.  Pt will be evaluated before next infusion.        Intravenous Access:  Lab draw site L arm, Needle type PIV, Gauge 22.  Labs drawn without difficulty.  Peripheral IV placed.    Treatment Conditions:  Lab Results   Component Value Date    HGB 12.4 12/31/2024    WBC 5.2 12/31/2024    ANEUTAUTO 3.0 12/31/2024     12/31/2024        Lab Results   Component Value Date     12/31/2024    POTASSIUM 4.5 12/31/2024    MAG 2.0 12/31/2024    CR 0.64 12/31/2024    REBECCA 10.2 12/31/2024    BILITOTAL 0.4 12/31/2024    ALBUMIN 4.6 12/31/2024    ALT 30 12/31/2024    AST  12/31/2024      Comment:      Unsatisfactory specimen - hemolyzed        Results reviewed, labs MET treatment parameters, ok to proceed with treatment.      Post Infusion Assessment:  Patient tolerated infusion poorly due to : Hypersensitivity: Did patient have a " "hypersensitivity reaction? : Yes  Drug or Product name: Paclitaxol  Were pre-meds administered?: Yes  What pre-meds were administered?: Diphenhydramine (Benadryl), Famotidine (Pepcid), Palonesetron (aloxi), Dexamethasone (decadron, Other (comment) (Cinvanti)  First or Subsequent treatment: Subsequent infusion  Rate of infusion when patient had hypersensitivity reaction: 199  Time the hypersensitivity reaction was first recognized: 0938  Symptoms observed or reported (select all that apply): Hypotension, Nausea, Other: (Comment) (feeling of \"vasovagal\" per patient; lightheaded; feeling like she needed to have a bowel movement urgently)  Interventions/treatment following reaction: Infusion stopped, Hypersensitivity medications administered, Reduced rate of medication administration (rechallenged medication 30 min after additional meds given at 100mL and increased to 199mL after 15 min)  What hypersensitivity medications were administered?: DiphendydrAMINE (benadryl), Methylprednisolone, NaCl 0.9% Bolus  Name of provider notified: Jamshid Bear NP  Time provider notified: 2576  Type of notification (select all that apply): Provider at bedside  Was the patient re-challenged today?: Yes - tolerated well  Blood return noted pre and post infusion.  Site patent and intact, free from redness, edema or discomfort.  No evidence of extravasations.  Access discontinued per protocol.       Discharge Plan:   Discharge instructions reviewed with: Patient and Family.  Patient and/or family verbalized understanding of discharge instructions and all questions answered.  AVS to patient via [employer].  Patient will return 1/1 for next appointment.   Patient discharged in stable condition accompanied by: self and .  Departure Mode: Ambulatory.      Haleigh Brown RN   "

## 2025-01-01 ENCOUNTER — INFUSION THERAPY VISIT (OUTPATIENT)
Dept: INFUSION THERAPY | Facility: CLINIC | Age: 65
End: 2025-01-01
Attending: OBSTETRICS & GYNECOLOGY
Payer: COMMERCIAL

## 2025-01-01 VITALS
DIASTOLIC BLOOD PRESSURE: 79 MMHG | TEMPERATURE: 98.3 F | HEART RATE: 76 BPM | RESPIRATION RATE: 18 BRPM | SYSTOLIC BLOOD PRESSURE: 150 MMHG

## 2025-01-01 DIAGNOSIS — D70.1 CHEMOTHERAPY-INDUCED NEUTROPENIA (H): Primary | ICD-10-CM

## 2025-01-01 DIAGNOSIS — T45.1X5A CHEMOTHERAPY-INDUCED NEUTROPENIA (H): Primary | ICD-10-CM

## 2025-01-01 DIAGNOSIS — C54.1 ENDOMETRIAL CANCER (H): ICD-10-CM

## 2025-01-01 PROCEDURE — 250N000011 HC RX IP 250 OP 636: Mod: JZ | Performed by: OBSTETRICS & GYNECOLOGY

## 2025-01-01 PROCEDURE — 96372 THER/PROPH/DIAG INJ SC/IM: CPT | Performed by: OBSTETRICS & GYNECOLOGY

## 2025-01-01 RX ADMIN — PEGFILGRASTIM-JMDB 6 MG: 6 INJECTION SUBCUTANEOUS at 12:58

## 2025-01-01 NOTE — PROGRESS NOTES
Infusion Nursing Note:  Estelita L Evelin presents today for ***.    Patient seen by provider today: {YES (EXPLAIN)/NO:224986}   present during visit today: {UMHLANGUAGE:915842}    Note: {Not Applicable or free text:903875:s}.      Intravenous Access:  {UMHIVACCESS:643474}    Treatment Conditions:  {UMHTXCONDITIONS:878105}      Post Infusion Assessment:  {UMHPOSTINFUSION:324154}       Discharge Plan:   {UMHDISCHARGE:414628}      Julia Youssef RN

## 2025-01-01 NOTE — PROGRESS NOTES
Infusion Nursing Note:  Estelita Waters presents today for fluphila.    Patient seen by provider today: No   present during visit today: Not Applicable.    Note: First dose of fulphila. Discussed drug and potential side effects with patient.      Intravenous Access:  No Intravenous access/labs at this visit.    Treatment Conditions:  Not Applicable.      Post Infusion Assessment:  Patient tolerated injection without incident.       Discharge Plan:   Discharge instructions reviewed with: Patient.  Patient and/or family verbalized understanding of discharge instructions and all questions answered.  Patient discharged in stable condition accompanied by: self.  Departure Mode: Ambulatory.      Julia Youssef RN

## 2025-01-01 NOTE — PROGRESS NOTES
Infusion Nursing Note:  Estelita Waters presents today for Fulphila.    Patient seen by provider today: No   present during visit today: Not Applicable.    Note: N/A.      Intravenous Access:  No Intravenous access/labs at this visit.    Treatment Conditions:  Not Applicable.      Post Infusion Assessment:  Patient tolerated injection without incident.       Discharge Plan:   Patient declined prescription refills.  Discharge instructions reviewed with: Patient.  Patient verbalized understanding of discharge instructions and all questions answered.  AVS to patient via [employer].  Patient will return as scheduled for next appointment.   Patient discharged in stable condition accompanied by: self.  Departure Mode: Ambulatory.      Aaliyah Tidwell RN

## 2025-01-15 NOTE — PROGRESS NOTES
Virtual Visit Details    Type of service:  Video Visit     Originating Location (pt. Location): Home    Distant Location (provider location):  On-site  Platform used for Video Visit: [facility]              Gynecologic Oncology Return Visit Note    Date: 2025     RE: Estelita Waters  : 1960  KAYLEIGH: 2025     CC: Stage IB serous endometrial cancer      HPI:  Estelita Waters is a 64 year old woman with a diagnosis of stage IB serous endometrial cancer.  She is here today for follow up and disease management by video.      Oncology History:  Initially, she presented with PMB.     24: Pelvic US  IMPRESSION:   1. Circumscribed hyperechoic mass centered in the endometrium measuring up to   1.9 cm. Differential considerations include a submucosal fibroid, large   endometrial polyp and endometrial malignancy. Recommend tissue sampling for   further evaluation. Female pelvic MRI may provide additional diagnostic   information, could be considered for further evaluation.   2. Fibroid uterus.   3. No suspicious adnexal mass.      24: Diagnostic hysteroscopy, hysteroscopic myomectomy, dilation and curettage    A.  Endometrium, Endometrial curettings and fibroid, curettage:    High-grade carcinoma possibly arising in a polyp, favor serous,  pending POLE mutational status, see comment  Comment:  Stain Results Comments   P53 Negative, P53 ABNORMAL All performed on Block B4   P16 positive     WT1 positive     P53 shows completely absent staining of the epithelial component (abnormal) but wild type staining in the stromal elements (internal control).  MMR intact.   No mutations were detected in POLE.  The measured tumor mutation burden (TMB) is low in this sample.     24:  12.  CT CAP  IMPRESSION:  1.  No evidence of metastatic disease in the chest, abdomen, or  pelvis.     24: HYSTERECTOMY, TOTAL, ROBOT-ASSISTED, WITH BILATERAL SALPINGO-OOPHORECTOMY, AND BILATERAL PELVIC sentinel sampling,  omentectomy   A. Lymph node, RIGHT para-aortic, excision:  - One lymph node, negative for carcinoma (0/1)  B. Lymph node, LEFT external iliac, sentinel, biopsy:  - One lymph node, negative for carcinoma (0/1)  C. Uterus, cervix, bilateral fallopian tubes and ovaries, total laparoscopic hysterectomy with bilateral salpingo-oophorectomy:  - SEROUS CARCINOMA OF THE ENDOMETRIUM (size: 6.1 cm)  - Carcinoma invades 8 mm into a 14 mm myometrium (57%)  - Myometrial leiomyomas (largest: 3.2 cm) with hyalinization and calcification  - Cervix with Nabothian cysts   - Bilateral ovaries with focal surface adhesions  - Bilateral fallopian tubes with paratubal cysts and focal surface adhesions  - See tumor synoptic report below  D. Omentum, omentectomy:  - Fibroadipose tissue, negative for malignancy  Washings negative.  HER2 +2         Plan: Carboplatin AUC 6 and paclitaxel 175 mg/m2 IV every 3 weeks x 6 cycles.     10/22/24: Cycle 1 carboplatin and paclitaxel.  11/13/24: Cycle 2 carboplatin and paclitaxel.  12/4/24: Cycle 3 carboplatin and paclitaxel.  12/23/24: Cycle 4 carboplatin and paclitaxel, deferred x 1 week due to neutropenia (1.0.).  Given 12/31/24, + Neulasta.  1/21/25: Cycle 5 carboplatin and paclitaxel planned.  Neulasta.               Today she reports feeling well overall and is ready for her next cycle of treatment.  She feels like symptoms from her last cycle were better then prior.  She was in [place] for a couple weeks and was very active.    She was diagnosed with conjunctivitis in both eyes and started on an antibiotic eye drop.  Symptoms have resolved in her right eye but still some soreness in the left eye.      She continues to have some intermittent numbness in her finger tips that seems to have improved.  No symptoms in her feet.  No impact of function. She is taking vitamin B6 100 mg daily and L-glutamine 2000 mg twice daily.    She had some bone pain and muscle aches that were better then her prior cycle.   Symptoms well managed with Claritin and tylenol as needed.    No nausea or vomiting.  Using antiemetics occasionally.  Normal appetite.  No unintended weight loss.  No changes in bowel or bladder habits.  No leg swelling.  No fever/chills.  No chest pain.  No shortness of breath.               Past Medical History:    Past Medical History:   Diagnosis Date    History of melanoma     Uterine cancer (H)          Past Surgical History:    Past Surgical History:   Procedure Laterality Date     SECTION      DAVINCI HYSTERECTOMY TOTAL, BILATERAL SALPINGO-OOPHORECTOMY, NODE DISSECTION, COMBINED Bilateral 2024    Procedure: HYSTERECTOMY, TOTAL, ROBOT-ASSISTED, WITH BILATERAL SALPINGO-OOPHORECTOMY, AND BILATERAL PELVIC sentinel sampling, omentectomy;  Surgeon: Wenceslao Burt MD;  Location: [facility] OR    HYSTERECTOMY, TOTAL, ROBOT-ASSISTED, WITH BILATERAL SALPINGO-OOPHORECTOMY, AND BILATERAL PELVIC sentinel sampling, omentectomy - Bilateral  2024    MOHS MICROGRAPHIC PROCEDURE      MT BREAST AUGMENTATION      WRIST SURGERY           Health Maintenance Due   Topic Date Due    ADVANCE CARE PLANNING  Never done    HIV SCREENING  Never done    HEPATITIS C SCREENING  Never done    Pneumococcal Vaccine: 50+ Years (1 of 2 - PCV) Never done    PAP  Never done    LIPID  Never done    RSV VACCINE (1 - Risk 60-74 years 1-dose series) Never done    COVID-19 Vaccine (2024- season) 2024    PHQ-2 (once per calendar year)  2025       Current Medications:     Current Outpatient Medications   Medication Sig Dispense Refill    bimatoprost (LATISSE) 0.03 % external opthalmic solution Apply 1 drop topically at bedtime. Apply to eyebrows/lashes      ondansetron (ZOFRAN) 8 MG tablet Take 1 tablet (8 mg) by mouth every 8 hours as needed for nausea (vomiting). Do not take for 3 days after chemo. 30 tablet 2    prochlorperazine (COMPAZINE) 10 MG tablet Take 1 tablet (10 mg) by mouth every 6 hours as needed for  "nausea or vomiting. 30 tablet 2    tretinoin (RETIN-A) 0.1 % external cream Apply 1 Application topically as needed.           Allergies:      No Known Allergies     Social History:     Social History     Tobacco Use    Smoking status: Never     Passive exposure: Past (per pt)    Smokeless tobacco: Never   Substance Use Topics    Alcohol use: Yes     Alcohol/week: 4.0 standard drinks of alcohol     Types: 4 Glasses of wine per week       History   Drug Use Unknown         Family History:     The patient's family history is notable for:    Family History   Problem Relation Age of Onset    Pulmonary Embolism Mother         unknown cause    Deep Vein Thrombosis (DVT) Mother     Anesthesia Reaction No family hx of          Physical Exam:     Ht 1.702 m (5' 7\")   Wt 60.3 kg (133 lb)   LMP  (LMP Unknown)   BMI 20.83 kg/m    Body mass index is 20.83 kg/m .    General Appearance: alert, no distress    Eyes:  Eyes grossly normal to inspection.  No discharge or erythema, or obvious scleral/conjunctival abnormalities.    Respiratory: No audible wheeze, cough, or visible cyanosis.  No visible retractions or increased work of breathing.     Musculoskeletal: extremities non tender and without edema    Skin: no lesions or rashes on visible skin    Neurological: normal gait, no gross defects     Psychiatric: appropriate mood and affect. Mentation appears normal, affect normal/bright, judgement and insight intact, normal speech and appearance well-groomed                            The rest of a comprehensive physical examination is deferred due to video visit restrictions.     No results found for this or any previous visit (from the past 24 hours).       Assessment:    Estelita Waters is a 64 year old woman with a diagnosis of stage IB serous endometrial cancer.  She is here today for follow up and disease management by video.     20 minutes spent on the date of the encounter doing chart review, history and exam, documentation, " and further activities as noted above.      Plan:     1.)        Endometrial cancer:  OK to proceed with planned treatment tomorrow if labs are WDL with Neulasta.  RTC in 3 weeks for labs, provider visit, and next cycle; this is already scheduled.  Plan for follow up with Dr. Burt after cycle 6; request sent for scheduling.  Reviewed signs and symptoms for when she should contact the clinic or seek additional care.  Patient to contact the clinic with any questions or concerns in the interim.      Genetic risk factors were assessed and she is scheduled for a new patient genetic counseling referral 5/12/25.  IHC  P53 ABNORMAL   P16 positive  WT1 positive  MMR intact  POLE mutation   HER2 positive equivocal (2+)      Labs and/or tests ordered include:  CBC. CMP. Mag                2.)        Health maintenance:  Issues addressed today include following up with PCP for annual health maintenance and non-gynecologic issues.      3.) Chemotherapy induced nausea: Some nausea intermittent for a few days during the first week after treatment; better this cycle.  Well managed with antiemetics.  No vomiting.  No decreased appetite.  No weight loss.  OK to not take decadron as it was causing insomnia.  Continue antiemetics as needed.       4.) Neuropathy: Some numbness in the tips of her fingers; better this cycle.  Not impacting function.  No symptoms in feet.  OK to use vitamin B6 and Lglutamine if it is helpful.  OK to continue taxol at its current dose but may need to consider dose reduction or switching to taxotere pending symptoms.    5.) Bone pain: Some bone pain for a couple days after chemotherapy.  Improved with clartin daily and tylenol as needed.      Shira Edward, DNP, APRN, FNP-C, AOCNP  Oncology Nurse Practitioner  Division of Gynecologic Oncology  Pager: [phone]     CC  Patient Care Team:  Katie Centeno DO as PCP - General  Wenceslao Burt MD as MD (Gynecologic Oncology)  Shira Edward  CARMELO, VANESA GUERRIER as Assigned Cancer Care Provider  SELF, REFERRED

## 2025-01-20 ENCOUNTER — VIRTUAL VISIT (OUTPATIENT)
Dept: ONCOLOGY | Facility: CLINIC | Age: 65
End: 2025-01-20
Attending: NURSE PRACTITIONER
Payer: COMMERCIAL

## 2025-01-20 VITALS — BODY MASS INDEX: 20.88 KG/M2 | HEIGHT: 67 IN | WEIGHT: 133 LBS

## 2025-01-20 DIAGNOSIS — T45.1X5A CHEMOTHERAPY-INDUCED NEUTROPENIA: ICD-10-CM

## 2025-01-20 DIAGNOSIS — C54.1 ENDOMETRIAL CANCER (H): Primary | ICD-10-CM

## 2025-01-20 DIAGNOSIS — D70.1 CHEMOTHERAPY-INDUCED NEUTROPENIA: ICD-10-CM

## 2025-01-20 DIAGNOSIS — Z51.11 ENCOUNTER FOR ANTINEOPLASTIC CHEMOTHERAPY: ICD-10-CM

## 2025-01-20 PROCEDURE — 98005 SYNCH AUDIO-VIDEO EST LOW 20: CPT | Performed by: NURSE PRACTITIONER

## 2025-01-20 RX ORDER — EPINEPHRINE 1 MG/ML
0.3 INJECTION, SOLUTION INTRAMUSCULAR; SUBCUTANEOUS EVERY 5 MIN PRN
Status: CANCELLED | OUTPATIENT
Start: 2025-01-21

## 2025-01-20 RX ORDER — METHYLPREDNISOLONE SODIUM SUCCINATE 40 MG/ML
40 INJECTION INTRAMUSCULAR; INTRAVENOUS
Status: CANCELLED
Start: 2025-01-21

## 2025-01-20 RX ORDER — HEPARIN SODIUM,PORCINE 10 UNIT/ML
5-20 VIAL (ML) INTRAVENOUS DAILY PRN
Status: CANCELLED | OUTPATIENT
Start: 2025-01-21

## 2025-01-20 RX ORDER — DIPHENHYDRAMINE HYDROCHLORIDE 50 MG/ML
25 INJECTION INTRAMUSCULAR; INTRAVENOUS
Status: CANCELLED
Start: 2025-01-21

## 2025-01-20 RX ORDER — ALBUTEROL SULFATE 90 UG/1
1-2 INHALANT RESPIRATORY (INHALATION)
Status: CANCELLED
Start: 2025-01-21

## 2025-01-20 RX ORDER — DEXAMETHASONE SODIUM PHOSPHATE 4 MG/ML
12 INJECTION, SOLUTION INTRA-ARTICULAR; INTRALESIONAL; INTRAMUSCULAR; INTRAVENOUS; SOFT TISSUE ONCE
Status: CANCELLED
Start: 2025-01-21

## 2025-01-20 RX ORDER — DIPHENHYDRAMINE HCL 25 MG
50 CAPSULE ORAL ONCE
Status: CANCELLED
Start: 2025-01-21

## 2025-01-20 RX ORDER — LORAZEPAM 2 MG/ML
0.5 INJECTION INTRAMUSCULAR EVERY 4 HOURS PRN
Status: CANCELLED | OUTPATIENT
Start: 2025-01-21

## 2025-01-20 RX ORDER — MEPERIDINE HYDROCHLORIDE 25 MG/ML
25 INJECTION INTRAMUSCULAR; INTRAVENOUS; SUBCUTANEOUS
Status: CANCELLED | OUTPATIENT
Start: 2025-01-21

## 2025-01-20 RX ORDER — DIPHENHYDRAMINE HYDROCHLORIDE 50 MG/ML
50 INJECTION INTRAMUSCULAR; INTRAVENOUS
Status: CANCELLED
Start: 2025-01-21

## 2025-01-20 RX ORDER — ALBUTEROL SULFATE 0.83 MG/ML
2.5 SOLUTION RESPIRATORY (INHALATION)
Status: CANCELLED | OUTPATIENT
Start: 2025-01-21

## 2025-01-20 RX ORDER — HEPARIN SODIUM (PORCINE) LOCK FLUSH IV SOLN 100 UNIT/ML 100 UNIT/ML
5 SOLUTION INTRAVENOUS
Status: CANCELLED | OUTPATIENT
Start: 2025-01-21

## 2025-01-20 RX ORDER — PALONOSETRON 0.05 MG/ML
0.25 INJECTION, SOLUTION INTRAVENOUS ONCE
Status: CANCELLED | OUTPATIENT
Start: 2025-01-21

## 2025-01-20 ASSESSMENT — PAIN SCALES - GENERAL: PAINLEVEL_OUTOF10: NO PAIN (0)

## 2025-01-20 NOTE — LETTER
2025      Estelita Waters  [address] [place] [address]      Dear Colleague,    Thank you for referring your patient, Estelita Waters, to the [facility]. Please see a copy of my visit note below.    Virtual Visit Details    Type of service:  Video Visit     Originating Location (pt. Location): Home    Distant Location (provider location):  On-site  Platform used for Video Visit: [facility]              Gynecologic Oncology Return Visit Note    Date: 2025     RE: Estelita Waters  : 1960  KAYLEIGH: 2025     CC: Stage IB serous endometrial cancer      HPI:  Estelita Waters is a 64 year old woman with a diagnosis of stage IB serous endometrial cancer.  She is here today for follow up and disease management by video.      Oncology History:  Initially, she presented with PMB.     24: Pelvic US  IMPRESSION:   1. Circumscribed hyperechoic mass centered in the endometrium measuring up to   1.9 cm. Differential considerations include a submucosal fibroid, large   endometrial polyp and endometrial malignancy. Recommend tissue sampling for   further evaluation. Female pelvic MRI may provide additional diagnostic   information, could be considered for further evaluation.   2. Fibroid uterus.   3. No suspicious adnexal mass.      24: Diagnostic hysteroscopy, hysteroscopic myomectomy, dilation and curettage    A.  Endometrium, Endometrial curettings and fibroid, curettage:    High-grade carcinoma possibly arising in a polyp, favor serous,  pending POLE mutational status, see comment  Comment:  Stain Results Comments   P53 Negative, P53 ABNORMAL All performed on Block B4   P16 positive     WT1 positive     P53 shows completely absent staining of the epithelial component (abnormal) but wild type staining in the stromal elements (internal control).  MMR intact.   No mutations were detected in POLE.  The measured tumor mutation burden (TMB) is low in this sample.      9/4/24:  12.  CT CAP  IMPRESSION:  1.  No evidence of metastatic disease in the chest, abdomen, or  pelvis.     9/24/24: HYSTERECTOMY, TOTAL, ROBOT-ASSISTED, WITH BILATERAL SALPINGO-OOPHORECTOMY, AND BILATERAL PELVIC sentinel sampling, omentectomy   A. Lymph node, RIGHT para-aortic, excision:  - One lymph node, negative for carcinoma (0/1)  B. Lymph node, LEFT external iliac, sentinel, biopsy:  - One lymph node, negative for carcinoma (0/1)  C. Uterus, cervix, bilateral fallopian tubes and ovaries, total laparoscopic hysterectomy with bilateral salpingo-oophorectomy:  - SEROUS CARCINOMA OF THE ENDOMETRIUM (size: 6.1 cm)  - Carcinoma invades 8 mm into a 14 mm myometrium (57%)  - Myometrial leiomyomas (largest: 3.2 cm) with hyalinization and calcification  - Cervix with Nabothian cysts   - Bilateral ovaries with focal surface adhesions  - Bilateral fallopian tubes with paratubal cysts and focal surface adhesions  - See tumor synoptic report below  D. Omentum, omentectomy:  - Fibroadipose tissue, negative for malignancy  Washings negative.  HER2 +2         Plan: Carboplatin AUC 6 and paclitaxel 175 mg/m2 IV every 3 weeks x 6 cycles.     10/22/24: Cycle 1 carboplatin and paclitaxel.  11/13/24: Cycle 2 carboplatin and paclitaxel.  12/4/24: Cycle 3 carboplatin and paclitaxel.  12/23/24: Cycle 4 carboplatin and paclitaxel, deferred x 1 week due to neutropenia (1.0.).  Given 12/31/24, + Neulasta.  1/21/25: Cycle 5 carboplatin and paclitaxel planned.  Neulasta.               Today she reports feeling well overall and is ready for her next cycle of treatment.  She feels like symptoms from her last cycle were better then prior.  She was in [place] for a couple weeks and was very active.    She was diagnosed with conjunctivitis in both eyes and started on an antibiotic eye drop.  Symptoms have resolved in her right eye but still some soreness in the left eye.      She continues to have some intermittent numbness in her  finger tips that seems to have improved.  No symptoms in her feet.  No impact of function. She is taking vitamin B6 100 mg daily and L-glutamine 2000 mg twice daily.    She had some bone pain and muscle aches that were better then her prior cycle.  Symptoms well managed with Claritin and tylenol as needed.    No nausea or vomiting.  Using antiemetics occasionally.  Normal appetite.  No unintended weight loss.  No changes in bowel or bladder habits.  No leg swelling.  No fever/chills.  No chest pain.  No shortness of breath.               Past Medical History:    Past Medical History:   Diagnosis Date     History of melanoma      Uterine cancer (H)          Past Surgical History:    Past Surgical History:   Procedure Laterality Date      SECTION       DAVINCI HYSTERECTOMY TOTAL, BILATERAL SALPINGO-OOPHORECTOMY, NODE DISSECTION, COMBINED Bilateral 2024    Procedure: HYSTERECTOMY, TOTAL, ROBOT-ASSISTED, WITH BILATERAL SALPINGO-OOPHORECTOMY, AND BILATERAL PELVIC sentinel sampling, omentectomy;  Surgeon: Wenceslao Burt MD;  Location: [facility] OR     HYSTERECTOMY, TOTAL, ROBOT-ASSISTED, WITH BILATERAL SALPINGO-OOPHORECTOMY, AND BILATERAL PELVIC sentinel sampling, omentectomy - Bilateral  2024     MOHS MICROGRAPHIC PROCEDURE       NM BREAST AUGMENTATION       WRIST SURGERY           Health Maintenance Due   Topic Date Due     ADVANCE CARE PLANNING  Never done     HIV SCREENING  Never done     HEPATITIS C SCREENING  Never done     Pneumococcal Vaccine: 50+ Years (1 of 2 - PCV) Never done     PAP  Never done     LIPID  Never done     RSV VACCINE (1 - Risk 60-74 years 1-dose series) Never done     COVID-19 Vaccine (2024- season) 2024     PHQ-2 (once per calendar year)  2025       Current Medications:     Current Outpatient Medications   Medication Sig Dispense Refill     bimatoprost (LATISSE) 0.03 % external opthalmic solution Apply 1 drop topically at bedtime. Apply to eyebrows/lashes    "    ondansetron (ZOFRAN) 8 MG tablet Take 1 tablet (8 mg) by mouth every 8 hours as needed for nausea (vomiting). Do not take for 3 days after chemo. 30 tablet 2     prochlorperazine (COMPAZINE) 10 MG tablet Take 1 tablet (10 mg) by mouth every 6 hours as needed for nausea or vomiting. 30 tablet 2     tretinoin (RETIN-A) 0.1 % external cream Apply 1 Application topically as needed.           Allergies:      No Known Allergies     Social History:     Social History     Tobacco Use     Smoking status: Never     Passive exposure: Past (per pt)     Smokeless tobacco: Never   Substance Use Topics     Alcohol use: Yes     Alcohol/week: 4.0 standard drinks of alcohol     Types: 4 Glasses of wine per week       History   Drug Use Unknown         Family History:     The patient's family history is notable for:    Family History   Problem Relation Age of Onset     Pulmonary Embolism Mother         unknown cause     Deep Vein Thrombosis (DVT) Mother      Anesthesia Reaction No family hx of          Physical Exam:     Ht 1.702 m (5' 7\")   Wt 60.3 kg (133 lb)   LMP  (LMP Unknown)   BMI 20.83 kg/m    Body mass index is 20.83 kg/m .    General Appearance: alert, no distress    Eyes:  Eyes grossly normal to inspection.  No discharge or erythema, or obvious scleral/conjunctival abnormalities.    Respiratory: No audible wheeze, cough, or visible cyanosis.  No visible retractions or increased work of breathing.     Musculoskeletal: extremities non tender and without edema    Skin: no lesions or rashes on visible skin    Neurological: normal gait, no gross defects     Psychiatric: appropriate mood and affect. Mentation appears normal, affect normal/bright, judgement and insight intact, normal speech and appearance well-groomed                            The rest of a comprehensive physical examination is deferred due to video visit restrictions.     No results found for this or any previous visit (from the past 24 hours). "       Assessment:    Estelita Waters is a 64 year old woman with a diagnosis of stage IB serous endometrial cancer.  She is here today for follow up and disease management by video.     20 minutes spent on the date of the encounter doing chart review, history and exam, documentation, and further activities as noted above.      Plan:     1.)        Endometrial cancer:  OK to proceed with planned treatment tomorrow if labs are WDL with Neulasta.  RTC in 3 weeks for labs, provider visit, and next cycle; this is already scheduled.  Plan for follow up with Dr. Burt after cycle 6; request sent for scheduling.  Reviewed signs and symptoms for when she should contact the clinic or seek additional care.  Patient to contact the clinic with any questions or concerns in the interim.      Genetic risk factors were assessed and she is scheduled for a new patient genetic counseling referral 5/12/25.  IHC  P53 ABNORMAL   P16 positive  WT1 positive  MMR intact  POLE mutation   HER2 positive equivocal (2+)      Labs and/or tests ordered include:  CBC. CMP. Mag                2.)        Health maintenance:  Issues addressed today include following up with PCP for annual health maintenance and non-gynecologic issues.      3.) Chemotherapy induced nausea: Some nausea intermittent for a few days during the first week after treatment; better this cycle.  Well managed with antiemetics.  No vomiting.  No decreased appetite.  No weight loss.  OK to not take decadron as it was causing insomnia.  Continue antiemetics as needed.       4.) Neuropathy: Some numbness in the tips of her fingers; better this cycle.  Not impacting function.  No symptoms in feet.  OK to use vitamin B6 and Lglutamine if it is helpful.  OK to continue taxol at its current dose but may need to consider dose reduction or switching to taxotere pending symptoms.    5.) Bone pain: Some bone pain for a couple days after chemotherapy.  Improved with clartin daily and  tylenol as needed.      Shira Edward, DNP, APRN, FNP-C, AOCNP  Oncology Nurse Practitioner  Division of Gynecologic Oncology  Pager: [phone]     CC  Patient Care Team:  Katie Centeno DO as PCP - Wenceslao Iqbal MD as MD (Gynecologic Oncology)  Shira Edward APRN CNP as Assigned Cancer Care Provider  SELF, REFERRED      Again, thank you for allowing me to participate in the care of your patient.        Sincerely,        VANESA Romero CNP    Electronically signed

## 2025-01-20 NOTE — NURSING NOTE
Patient reviewed medications and allergies in Mychart during e-check in and said everything looked correct.      Patient is also taking- Of ofloxacin 0.3% 1 drop in each eye for 4 times daily.       Current patient location: [address] [place] [address] [address] [place] [address]    Is the patient currently in the state of [place]? YES    Visit mode: VIDEO    If the visit is dropped, the patient can be reconnected by:VIDEO VISIT: Text to cell phone:   Telephone Information:   Mobile [phone]    and VIDEO VISIT: Send to e-mail at: [email or web address]    Will anyone else be joining the visit? NO  (If patient encounters technical issues they should call [phone])    Are changes needed to the allergy or medication list? Of ofloxacin 0.3% 1 drop in each eye for 4 times daily.       Are refills needed on medications prescribed by this physician? NO    Rooming Documentation:  Not applicable    Reason for visit: ALEXI BLUE

## 2025-01-21 ENCOUNTER — INFUSION THERAPY VISIT (OUTPATIENT)
Dept: INFUSION THERAPY | Facility: CLINIC | Age: 65
End: 2025-01-21
Attending: NURSE PRACTITIONER
Payer: COMMERCIAL

## 2025-01-21 VITALS
WEIGHT: 141 LBS | TEMPERATURE: 98 F | SYSTOLIC BLOOD PRESSURE: 128 MMHG | DIASTOLIC BLOOD PRESSURE: 77 MMHG | HEART RATE: 58 BPM | OXYGEN SATURATION: 100 % | BODY MASS INDEX: 22.08 KG/M2 | RESPIRATION RATE: 16 BRPM

## 2025-01-21 DIAGNOSIS — C54.1 ENDOMETRIAL CANCER (H): ICD-10-CM

## 2025-01-21 DIAGNOSIS — T45.1X5A CHEMOTHERAPY-INDUCED NEUTROPENIA: Primary | ICD-10-CM

## 2025-01-21 DIAGNOSIS — D70.1 CHEMOTHERAPY-INDUCED NEUTROPENIA: Primary | ICD-10-CM

## 2025-01-21 LAB
ALBUMIN SERPL BCG-MCNC: 4.6 G/DL (ref 3.5–5.2)
ALP SERPL-CCNC: 100 U/L (ref 40–150)
ALT SERPL W P-5'-P-CCNC: 42 U/L (ref 0–50)
ANION GAP SERPL CALCULATED.3IONS-SCNC: 7 MMOL/L (ref 7–15)
AST SERPL W P-5'-P-CCNC: 35 U/L (ref 0–45)
BASOPHILS # BLD AUTO: 0.1 10E3/UL (ref 0–0.2)
BASOPHILS NFR BLD AUTO: 2 %
BILIRUB SERPL-MCNC: 0.4 MG/DL
BUN SERPL-MCNC: 15.5 MG/DL (ref 8–23)
CALCIUM SERPL-MCNC: 9.9 MG/DL (ref 8.8–10.4)
CHLORIDE SERPL-SCNC: 102 MMOL/L (ref 98–107)
CREAT SERPL-MCNC: 0.6 MG/DL (ref 0.51–0.95)
EGFRCR SERPLBLD CKD-EPI 2021: >90 ML/MIN/1.73M2
EOSINOPHIL # BLD AUTO: 0.1 10E3/UL (ref 0–0.7)
EOSINOPHIL NFR BLD AUTO: 1 %
ERYTHROCYTE [DISTWIDTH] IN BLOOD BY AUTOMATED COUNT: 14.4 % (ref 10–15)
GLUCOSE SERPL-MCNC: 87 MG/DL (ref 70–99)
HCO3 SERPL-SCNC: 27 MMOL/L (ref 22–29)
HCT VFR BLD AUTO: 36.6 % (ref 35–47)
HGB BLD-MCNC: 12.2 G/DL (ref 11.7–15.7)
IMM GRANULOCYTES # BLD: 0 10E3/UL
IMM GRANULOCYTES NFR BLD: 0 %
LYMPHOCYTES # BLD AUTO: 1.3 10E3/UL (ref 0.8–5.3)
LYMPHOCYTES NFR BLD AUTO: 33 %
MAGNESIUM SERPL-MCNC: 1.9 MG/DL (ref 1.7–2.3)
MCH RBC QN AUTO: 32.3 PG (ref 26.5–33)
MCHC RBC AUTO-ENTMCNC: 33.3 G/DL (ref 31.5–36.5)
MCV RBC AUTO: 97 FL (ref 78–100)
MONOCYTES # BLD AUTO: 0.5 10E3/UL (ref 0–1.3)
MONOCYTES NFR BLD AUTO: 13 %
NEUTROPHILS # BLD AUTO: 2.1 10E3/UL (ref 1.6–8.3)
NEUTROPHILS NFR BLD AUTO: 51 %
NRBC # BLD AUTO: 0 10E3/UL
NRBC BLD AUTO-RTO: 0 /100
PLATELET # BLD AUTO: 338 10E3/UL (ref 150–450)
POTASSIUM SERPL-SCNC: 4 MMOL/L (ref 3.4–5.3)
PROT SERPL-MCNC: 7.1 G/DL (ref 6.4–8.3)
RBC # BLD AUTO: 3.78 10E6/UL (ref 3.8–5.2)
SODIUM SERPL-SCNC: 136 MMOL/L (ref 135–145)
WBC # BLD AUTO: 4 10E3/UL (ref 4–11)

## 2025-01-21 PROCEDURE — 36415 COLL VENOUS BLD VENIPUNCTURE: CPT | Performed by: NURSE PRACTITIONER

## 2025-01-21 PROCEDURE — 83735 ASSAY OF MAGNESIUM: CPT | Performed by: NURSE PRACTITIONER

## 2025-01-21 PROCEDURE — 96415 CHEMO IV INFUSION ADDL HR: CPT

## 2025-01-21 PROCEDURE — 82040 ASSAY OF SERUM ALBUMIN: CPT | Performed by: NURSE PRACTITIONER

## 2025-01-21 PROCEDURE — 250N000013 HC RX MED GY IP 250 OP 250 PS 637: Performed by: NURSE PRACTITIONER

## 2025-01-21 PROCEDURE — 85004 AUTOMATED DIFF WBC COUNT: CPT | Performed by: NURSE PRACTITIONER

## 2025-01-21 PROCEDURE — 84155 ASSAY OF PROTEIN SERUM: CPT | Performed by: NURSE PRACTITIONER

## 2025-01-21 PROCEDURE — 250N000011 HC RX IP 250 OP 636: Performed by: NURSE PRACTITIONER

## 2025-01-21 PROCEDURE — 96417 CHEMO IV INFUS EACH ADDL SEQ: CPT

## 2025-01-21 PROCEDURE — 96413 CHEMO IV INFUSION 1 HR: CPT

## 2025-01-21 PROCEDURE — 258N000003 HC RX IP 258 OP 636: Performed by: NURSE PRACTITIONER

## 2025-01-21 PROCEDURE — 96375 TX/PRO/DX INJ NEW DRUG ADDON: CPT

## 2025-01-21 RX ORDER — PALONOSETRON 0.05 MG/ML
0.25 INJECTION, SOLUTION INTRAVENOUS ONCE
Status: COMPLETED | OUTPATIENT
Start: 2025-01-21 | End: 2025-01-21

## 2025-01-21 RX ORDER — DEXAMETHASONE SODIUM PHOSPHATE 4 MG/ML
12 INJECTION, SOLUTION INTRA-ARTICULAR; INTRALESIONAL; INTRAMUSCULAR; INTRAVENOUS; SOFT TISSUE ONCE
Status: COMPLETED | OUTPATIENT
Start: 2025-01-21 | End: 2025-01-21

## 2025-01-21 RX ORDER — DIPHENHYDRAMINE HCL 25 MG
50 CAPSULE ORAL ONCE
Status: COMPLETED | OUTPATIENT
Start: 2025-01-21 | End: 2025-01-21

## 2025-01-21 RX ADMIN — PALONOSETRON HYDROCHLORIDE 0.25 MG: 0.25 INJECTION INTRAVENOUS at 09:00

## 2025-01-21 RX ADMIN — APREPITANT 130 MG: 130 INJECTION, EMULSION INTRAVENOUS at 08:55

## 2025-01-21 RX ADMIN — CARBOPLATIN 700 MG: 10 INJECTION, SOLUTION INTRAVENOUS at 12:32

## 2025-01-21 RX ADMIN — DIPHENHYDRAMINE HYDROCHLORIDE 50 MG: 25 CAPSULE ORAL at 08:48

## 2025-01-21 RX ADMIN — PACLITAXEL 299 MG: 6 INJECTION, SOLUTION INTRAVENOUS at 09:14

## 2025-01-21 RX ADMIN — FAMOTIDINE 20 MG: 10 INJECTION, SOLUTION INTRAVENOUS at 09:04

## 2025-01-21 RX ADMIN — DEXAMETHASONE SODIUM PHOSPHATE 12 MG: 4 INJECTION INTRA-ARTICULAR; INTRALESIONAL; INTRAMUSCULAR; INTRAVENOUS; SOFT TISSUE at 09:06

## 2025-01-21 RX ADMIN — SODIUM CHLORIDE 250 ML: 9 INJECTION, SOLUTION INTRAVENOUS at 08:48

## 2025-01-21 NOTE — PROGRESS NOTES
Infusion Nursing Note:  Estelita Waters presents today for C5D1 Taxol/Carbo and labs    Patient seen by provider today: Yes: Shira Edward   present during visit today: Not Applicable.    Note: N/A.      Intravenous Access:  Lab draw site left arm, Needle type Butterfly, Gauge 23.  Labs drawn without difficulty.    Treatment Conditions:  Lab Results   Component Value Date    HGB 12.2 01/21/2025    WBC 4.0 01/21/2025    ANEUTAUTO 2.1 01/21/2025     01/21/2025        Lab Results   Component Value Date     01/21/2025    POTASSIUM 4.0 01/21/2025    MAG 1.9 01/21/2025    CR 0.60 01/21/2025    REBECCA 9.9 01/21/2025    BILITOTAL 0.4 01/21/2025    ALBUMIN 4.6 01/21/2025    ALT 42 01/21/2025    AST 35 01/21/2025       Results reviewed, labs MET treatment parameters, ok to proceed with treatment.      Post Infusion Assessment:  Patient tolerated infusion without incident.  Blood return noted pre and post infusion.  Site patent and intact, free from redness, edema or discomfort.  No evidence of extravasations.  Access discontinued per protocol.       Discharge Plan:   Patient declined prescription refills.  Discharge instructions reviewed with: Patient.  Patient and/or family verbalized understanding of discharge instructions and all questions answered.  AVS to patient via [employer].  Patient will return 1/22 for next appointment.   Patient discharged in stable condition accompanied by: self and .  Departure Mode: Ambulatory.      Win Reynolds RN

## 2025-01-22 ENCOUNTER — ALLIED HEALTH/NURSE VISIT (OUTPATIENT)
Dept: INFUSION THERAPY | Facility: CLINIC | Age: 65
End: 2025-01-22
Attending: OBSTETRICS & GYNECOLOGY
Payer: COMMERCIAL

## 2025-01-22 VITALS
HEART RATE: 57 BPM | TEMPERATURE: 98 F | RESPIRATION RATE: 16 BRPM | DIASTOLIC BLOOD PRESSURE: 55 MMHG | SYSTOLIC BLOOD PRESSURE: 106 MMHG

## 2025-01-22 DIAGNOSIS — T45.1X5A CHEMOTHERAPY-INDUCED NEUTROPENIA: Primary | ICD-10-CM

## 2025-01-22 DIAGNOSIS — C54.1 ENDOMETRIAL CANCER (H): ICD-10-CM

## 2025-01-22 DIAGNOSIS — D70.1 CHEMOTHERAPY-INDUCED NEUTROPENIA: Primary | ICD-10-CM

## 2025-01-22 PROCEDURE — 250N000011 HC RX IP 250 OP 636: Mod: JZ | Performed by: NURSE PRACTITIONER

## 2025-01-22 PROCEDURE — 96372 THER/PROPH/DIAG INJ SC/IM: CPT | Performed by: NURSE PRACTITIONER

## 2025-01-22 RX ADMIN — PEGFILGRASTIM-JMDB 6 MG: 6 INJECTION SUBCUTANEOUS at 13:47

## 2025-01-22 NOTE — PROGRESS NOTES
Infusion Nursing Note:  Estelitanj Waters presents today for Neulasta.    Patient seen by provider today: No   present during visit today: Not Applicable.    Note: N/A.      Intravenous Access:  No Intravenous access/labs at this visit.    Treatment Conditions:  Not Applicable.      Post Infusion Assessment:  Patient tolerated injection without incident.       Discharge Plan:   Patient discharged in stable condition accompanied by: self.  Departure Mode: Ambulatory.      German Coronel RN

## 2025-02-07 NOTE — PROGRESS NOTES
Virtual Visit Details    Type of service:  Video Visit     Originating Location (pt. Location): Home    Distant Location (provider location):  On-site  Platform used for Video Visit: [facility]    Gynecologic Oncology Return Visit Note    Date: Feb 10, 2025     RE: Estelita Waters  : 1960  KAYLEIGH: Feb 10, 2025     CC: Stage IB serous endometrial cancer      HPI:  Estelita Waters is a 64 year old woman with a diagnosis of stage IB serous endometrial cancer.  She is here today for follow up and disease management by video.      Oncology History:  Initially, she presented with PMB.     24: Pelvic US  IMPRESSION:   1. Circumscribed hyperechoic mass centered in the endometrium measuring up to   1.9 cm. Differential considerations include a submucosal fibroid, large   endometrial polyp and endometrial malignancy. Recommend tissue sampling for   further evaluation. Female pelvic MRI may provide additional diagnostic   information, could be considered for further evaluation.   2. Fibroid uterus.   3. No suspicious adnexal mass.      24: Diagnostic hysteroscopy, hysteroscopic myomectomy, dilation and curettage    A.  Endometrium, Endometrial curettings and fibroid, curettage:    High-grade carcinoma possibly arising in a polyp, favor serous,  pending POLE mutational status, see comment  Comment:  Stain Results Comments   P53 Negative, P53 ABNORMAL All performed on Block B4   P16 positive     WT1 positive     P53 shows completely absent staining of the epithelial component (abnormal) but wild type staining in the stromal elements (internal control).  MMR intact.   No mutations were detected in POLE.  The measured tumor mutation burden (TMB) is low in this sample.     24:  12.  CT CAP  IMPRESSION:  1.  No evidence of metastatic disease in the chest, abdomen, or  pelvis.     24: HYSTERECTOMY, TOTAL, ROBOT-ASSISTED, WITH BILATERAL SALPINGO-OOPHORECTOMY, AND BILATERAL PELVIC sentinel sampling, omentectomy    A. Lymph node, RIGHT para-aortic, excision:  - One lymph node, negative for carcinoma (0/1)  B. Lymph node, LEFT external iliac, sentinel, biopsy:  - One lymph node, negative for carcinoma (0/1)  C. Uterus, cervix, bilateral fallopian tubes and ovaries, total laparoscopic hysterectomy with bilateral salpingo-oophorectomy:  - SEROUS CARCINOMA OF THE ENDOMETRIUM (size: 6.1 cm)  - Carcinoma invades 8 mm into a 14 mm myometrium (57%)  - Myometrial leiomyomas (largest: 3.2 cm) with hyalinization and calcification  - Cervix with Nabothian cysts   - Bilateral ovaries with focal surface adhesions  - Bilateral fallopian tubes with paratubal cysts and focal surface adhesions  - See tumor synoptic report below  D. Omentum, omentectomy:  - Fibroadipose tissue, negative for malignancy  Washings negative.  HER2 +2         Plan: Carboplatin AUC 6 and paclitaxel 175 mg/m2 IV every 3 weeks x 6 cycles.     10/22/24: Cycle 1 carboplatin and paclitaxel.  11/13/24: Cycle 2 carboplatin and paclitaxel.  12/4/24: Cycle 3 carboplatin and paclitaxel.  12/23/24: Cycle 4 carboplatin and paclitaxel, deferred x 1 week due to neutropenia (ANC 1.0).  Given 12/31/24, + Neulasta.  1/21/25: Cycle 5 carboplatin and paclitaxel.  Neulasta.   2/11/25: Cycle 6 carboplatin and paclitaxel planned.  Neulasta.               Today she reports feeling well overall ready for her next cycle of treatment.    She reports that her bone pain is her most bothersome symptom at this point.  Last cycle bone pain was started for 2.5 days.  Taking Claritin once daily and Tylenol as needed.    She continues to have some neuropathy in her fingertips.  Symptoms come and go.  No symptoms in her feet.  Symptoms do not impact function.    No nausea or vomiting.  Normal appetite.  No unintended weight loss.  No changes in bowel or bladder habits.  No leg swelling.  No fever/chills.  No chest pain.  No shortness of breath.     She has been able to remain active and will walk or  run 5 miles per day on the treadmill.            Past Medical History:    Past Medical History:   Diagnosis Date    History of melanoma     Uterine cancer (H)          Past Surgical History:    Past Surgical History:   Procedure Laterality Date     SECTION      DAVINCI HYSTERECTOMY TOTAL, BILATERAL SALPINGO-OOPHORECTOMY, NODE DISSECTION, COMBINED Bilateral 2024    Procedure: HYSTERECTOMY, TOTAL, ROBOT-ASSISTED, WITH BILATERAL SALPINGO-OOPHORECTOMY, AND BILATERAL PELVIC sentinel sampling, omentectomy;  Surgeon: Wenceslao Burt MD;  Location: [facility] OR    HYSTERECTOMY, TOTAL, ROBOT-ASSISTED, WITH BILATERAL SALPINGO-OOPHORECTOMY, AND BILATERAL PELVIC sentinel sampling, omentectomy - Bilateral  2024    MOHS MICROGRAPHIC PROCEDURE      [place] BREAST AUGMENTATION      WRIST SURGERY           Health Maintenance Due   Topic Date Due    ADVANCE CARE PLANNING  Never done    HIV SCREENING  Never done    HEPATITIS C SCREENING  Never done    Pneumococcal Vaccine: 50+ Years (1 of 2 - PCV) Never done    PAP  Never done    LIPID  Never done    RSV VACCINE (1 - Risk 60-74 years 1-dose series) Never done    COVID-19 Vaccine ( season) 2024    PHQ-2 (once per calendar year)  2025       Current Medications:     Current Outpatient Medications   Medication Sig Dispense Refill    bimatoprost (LATISSE) 0.03 % external opthalmic solution Apply 1 drop topically at bedtime. Apply to eyebrows/lashes      ondansetron (ZOFRAN) 8 MG tablet Take 1 tablet (8 mg) by mouth every 8 hours as needed for nausea (vomiting). Do not take for 3 days after chemo. 30 tablet 2    prochlorperazine (COMPAZINE) 10 MG tablet Take 1 tablet (10 mg) by mouth every 6 hours as needed for nausea or vomiting. 30 tablet 2    tretinoin (RETIN-A) 0.1 % external cream Apply 1 Application topically as needed.           Allergies:      No Known Allergies     Social History:     Social History     Tobacco Use    Smoking status: Never      "Passive exposure: Past (per pt)    Smokeless tobacco: Never   Substance Use Topics    Alcohol use: Yes     Alcohol/week: 4.0 standard drinks of alcohol     Types: 4 Glasses of wine per week       History   Drug Use Unknown         Family History:     The patient's family history is notable for:    Family History   Problem Relation Age of Onset    Pulmonary Embolism Mother         unknown cause    Deep Vein Thrombosis (DVT) Mother     Anesthesia Reaction No family hx of          Physical Exam:     Ht 1.702 m (5' 7\")   Wt 60.3 kg (133 lb)   LMP  (LMP Unknown)   BMI 20.83 kg/m    Body mass index is 20.83 kg/m .    General Appearance: alert, no distress    Eyes:  Eyes grossly normal.  No obvious discharge, erythema, or scleral/conjunctival abnormalities.    Respiratory: No noted audible wheeze, cough, or visible cyanosis.  No visible retractions or increased work of breathing.     Skin: no lesions or rashes on visible skin     Psychiatric: appropriate mood and affect. Mentation appears normal, affect normal/bright, judgement and insight intact, normal speech and appearance well-groomed                            The rest of a comprehensive physical examination is deferred due to video visit restrictions.     No results found for this or any previous visit (from the past 24 hours).       Assessment:    Estelita Waters is a 64 year old woman with a diagnosis of stage IB serous endometrial cancer.  She is here today for follow up and disease management by video.      20 minutes spent on the date of the encounter doing chart review, history and exam, documentation, and further activities as noted above.      Plan:     1.)        Endometrial cancer:  OK to proceed with planned treatment tomorrow if labs are WDL with Neulasta.  RTC as scheduled for follow up with Dr. Burt.  Reviewed signs and symptoms for when she should contact the clinic or seek additional care.  Patient to contact the clinic with any questions or " concerns in the interim.      Genetic risk factors were assessed and she is scheduled for a new patient genetic counseling referral 5/12/25.  IHC  P53 ABNORMAL   P16 positive  WT1 positive  MMR intact  POLE mutation   HER2 positive equivocal (2+)      Labs and/or tests ordered include:  CBC. CMP. Mag                2.)        Health maintenance:  Issues addressed today include following up with PCP for annual health maintenance and non-gynecologic issues.      3.)        Neuropathy: Some numbness in the tips of her fingers intermittently.  Not impacting function.  No symptoms in feet.  OK to use vitamin B6 and Lglutamine if it is helpful.  OK to continue taxol at its current dose.     4.)        Bone pain: Some bone pain for a couple days after chemotherapy.  Improved with clartin daily and tylenol as needed.  Discussed she can try taking Claritin BID.      This note was created in part by the use of Dragon voice recognition dictation system. Inadvertent grammatical errors and typographical errors may exist.      Shira Edward, KANDICE, APRN, FNP-C, AOCNP  Oncology Nurse Practitioner  Division of Gynecologic Oncology  Pager: [phone]     CC  Patient Care Team:  Katie Centeno DO as PCP - General  Wenceslao Burt MD as MD (Gynecologic Oncology)  Shira Edward APRN CNP as Assigned Cancer Care Provider  SELF, REFERRED

## 2025-02-10 ENCOUNTER — VIRTUAL VISIT (OUTPATIENT)
Dept: ONCOLOGY | Facility: CLINIC | Age: 65
End: 2025-02-10
Attending: NURSE PRACTITIONER
Payer: COMMERCIAL

## 2025-02-10 VITALS — HEIGHT: 67 IN | WEIGHT: 133 LBS | BODY MASS INDEX: 20.88 KG/M2

## 2025-02-10 DIAGNOSIS — C54.1 ENDOMETRIAL CANCER (H): Primary | ICD-10-CM

## 2025-02-10 DIAGNOSIS — T45.1X5A CHEMOTHERAPY-INDUCED NEUTROPENIA: ICD-10-CM

## 2025-02-10 DIAGNOSIS — Z51.11 ENCOUNTER FOR ANTINEOPLASTIC CHEMOTHERAPY: ICD-10-CM

## 2025-02-10 DIAGNOSIS — D70.1 CHEMOTHERAPY-INDUCED NEUTROPENIA: ICD-10-CM

## 2025-02-10 PROCEDURE — 98005 SYNCH AUDIO-VIDEO EST LOW 20: CPT | Performed by: NURSE PRACTITIONER

## 2025-02-10 RX ORDER — METHYLPREDNISOLONE SODIUM SUCCINATE 40 MG/ML
40 INJECTION INTRAMUSCULAR; INTRAVENOUS
Status: CANCELLED
Start: 2025-02-11

## 2025-02-10 RX ORDER — HEPARIN SODIUM,PORCINE 10 UNIT/ML
5-20 VIAL (ML) INTRAVENOUS DAILY PRN
Status: CANCELLED | OUTPATIENT
Start: 2025-02-11

## 2025-02-10 RX ORDER — DEXAMETHASONE SODIUM PHOSPHATE 4 MG/ML
12 INJECTION, SOLUTION INTRA-ARTICULAR; INTRALESIONAL; INTRAMUSCULAR; INTRAVENOUS; SOFT TISSUE ONCE
Status: CANCELLED
Start: 2025-02-11

## 2025-02-10 RX ORDER — DIPHENHYDRAMINE HCL 25 MG
50 CAPSULE ORAL ONCE
Status: CANCELLED
Start: 2025-02-11

## 2025-02-10 RX ORDER — DIPHENHYDRAMINE HYDROCHLORIDE 50 MG/ML
25 INJECTION INTRAMUSCULAR; INTRAVENOUS
Status: CANCELLED
Start: 2025-02-11

## 2025-02-10 RX ORDER — MEPERIDINE HYDROCHLORIDE 25 MG/ML
25 INJECTION INTRAMUSCULAR; INTRAVENOUS; SUBCUTANEOUS
Status: CANCELLED | OUTPATIENT
Start: 2025-02-11

## 2025-02-10 RX ORDER — ALBUTEROL SULFATE 0.83 MG/ML
2.5 SOLUTION RESPIRATORY (INHALATION)
Status: CANCELLED | OUTPATIENT
Start: 2025-02-11

## 2025-02-10 RX ORDER — HEPARIN SODIUM (PORCINE) LOCK FLUSH IV SOLN 100 UNIT/ML 100 UNIT/ML
5 SOLUTION INTRAVENOUS
Status: CANCELLED | OUTPATIENT
Start: 2025-02-11

## 2025-02-10 RX ORDER — ALBUTEROL SULFATE 90 UG/1
1-2 INHALANT RESPIRATORY (INHALATION)
Status: CANCELLED
Start: 2025-02-11

## 2025-02-10 RX ORDER — DIPHENHYDRAMINE HYDROCHLORIDE 50 MG/ML
50 INJECTION INTRAMUSCULAR; INTRAVENOUS
Status: CANCELLED
Start: 2025-02-11

## 2025-02-10 RX ORDER — LORAZEPAM 2 MG/ML
0.5 INJECTION INTRAMUSCULAR EVERY 4 HOURS PRN
Status: CANCELLED | OUTPATIENT
Start: 2025-02-11

## 2025-02-10 RX ORDER — EPINEPHRINE 1 MG/ML
0.3 INJECTION, SOLUTION INTRAMUSCULAR; SUBCUTANEOUS EVERY 5 MIN PRN
Status: CANCELLED | OUTPATIENT
Start: 2025-02-11

## 2025-02-10 RX ORDER — PALONOSETRON 0.05 MG/ML
0.25 INJECTION, SOLUTION INTRAVENOUS ONCE
Status: CANCELLED | OUTPATIENT
Start: 2025-02-11

## 2025-02-10 ASSESSMENT — PAIN SCALES - GENERAL: PAINLEVEL_OUTOF10: NO PAIN (0)

## 2025-02-10 NOTE — LETTER
2/10/2025      Estelita Waters  [address] [place] [address]      Dear Colleague,    Thank you for referring your patient, Estelita Waters, to the [facility]. Please see a copy of my visit note below.    Virtual Visit Details    Type of service:  Video Visit     Originating Location (pt. Location): Home    Distant Location (provider location):  On-site  Platform used for Video Visit: [facility]    Gynecologic Oncology Return Visit Note    Date: Feb 10, 2025     RE: Estelita Waters  : 1960  KAYLEIGH: Feb 10, 2025     CC: Stage IB serous endometrial cancer      HPI:  Estelita Waters is a 64 year old woman with a diagnosis of stage IB serous endometrial cancer.  She is here today for follow up and disease management by video.      Oncology History:  Initially, she presented with PMB.     24: Pelvic US  IMPRESSION:   1. Circumscribed hyperechoic mass centered in the endometrium measuring up to   1.9 cm. Differential considerations include a submucosal fibroid, large   endometrial polyp and endometrial malignancy. Recommend tissue sampling for   further evaluation. Female pelvic MRI may provide additional diagnostic   information, could be considered for further evaluation.   2. Fibroid uterus.   3. No suspicious adnexal mass.      24: Diagnostic hysteroscopy, hysteroscopic myomectomy, dilation and curettage    A.  Endometrium, Endometrial curettings and fibroid, curettage:    High-grade carcinoma possibly arising in a polyp, favor serous,  pending POLE mutational status, see comment  Comment:  Stain Results Comments   P53 Negative, P53 ABNORMAL All performed on Block B4   P16 positive     WT1 positive     P53 shows completely absent staining of the epithelial component (abnormal) but wild type staining in the stromal elements (internal control).  MMR intact.   No mutations were detected in POLE.  The measured tumor mutation burden (TMB) is low in this sample.     24: CA  125 12.  CT CAP  IMPRESSION:  1.  No evidence of metastatic disease in the chest, abdomen, or  pelvis.     9/24/24: HYSTERECTOMY, TOTAL, ROBOT-ASSISTED, WITH BILATERAL SALPINGO-OOPHORECTOMY, AND BILATERAL PELVIC sentinel sampling, omentectomy   A. Lymph node, RIGHT para-aortic, excision:  - One lymph node, negative for carcinoma (0/1)  B. Lymph node, LEFT external iliac, sentinel, biopsy:  - One lymph node, negative for carcinoma (0/1)  C. Uterus, cervix, bilateral fallopian tubes and ovaries, total laparoscopic hysterectomy with bilateral salpingo-oophorectomy:  - SEROUS CARCINOMA OF THE ENDOMETRIUM (size: 6.1 cm)  - Carcinoma invades 8 mm into a 14 mm myometrium (57%)  - Myometrial leiomyomas (largest: 3.2 cm) with hyalinization and calcification  - Cervix with Nabothian cysts   - Bilateral ovaries with focal surface adhesions  - Bilateral fallopian tubes with paratubal cysts and focal surface adhesions  - See tumor synoptic report below  D. Omentum, omentectomy:  - Fibroadipose tissue, negative for malignancy  Washings negative.  HER2 +2         Plan: Carboplatin AUC 6 and paclitaxel 175 mg/m2 IV every 3 weeks x 6 cycles.     10/22/24: Cycle 1 carboplatin and paclitaxel.  11/13/24: Cycle 2 carboplatin and paclitaxel.  12/4/24: Cycle 3 carboplatin and paclitaxel.  12/23/24: Cycle 4 carboplatin and paclitaxel, deferred x 1 week due to neutropenia (ANC 1.0).  Given 12/31/24, + Neulasta.  1/21/25: Cycle 5 carboplatin and paclitaxel.  Neulasta.   2/11/25: Cycle 6 carboplatin and paclitaxel planned.  Neulasta.               Today she reports feeling well overall ready for her next cycle of treatment.    She reports that her bone pain is her most bothersome symptom at this point.  Last cycle bone pain was started for 2.5 days.  Taking Claritin once daily and Tylenol as needed.    She continues to have some neuropathy in her fingertips.  Symptoms come and go.  No symptoms in her feet.  Symptoms do not impact  function.    No nausea or vomiting.  Normal appetite.  No unintended weight loss.  No changes in bowel or bladder habits.  No leg swelling.  No fever/chills.  No chest pain.  No shortness of breath.     She has been able to remain active and will walk or run 5 miles per day on the treadmill.            Past Medical History:    Past Medical History:   Diagnosis Date     History of melanoma      Uterine cancer (H)          Past Surgical History:    Past Surgical History:   Procedure Laterality Date      SECTION       DAVINCI HYSTERECTOMY TOTAL, BILATERAL SALPINGO-OOPHORECTOMY, NODE DISSECTION, COMBINED Bilateral 2024    Procedure: HYSTERECTOMY, TOTAL, ROBOT-ASSISTED, WITH BILATERAL SALPINGO-OOPHORECTOMY, AND BILATERAL PELVIC sentinel sampling, omentectomy;  Surgeon: Wenceslao Burt MD;  Location: [facility] OR     HYSTERECTOMY, TOTAL, ROBOT-ASSISTED, WITH BILATERAL SALPINGO-OOPHORECTOMY, AND BILATERAL PELVIC sentinel sampling, omentectomy - Bilateral  2024     MOHS MICROGRAPHIC PROCEDURE       VT BREAST AUGMENTATION       WRIST SURGERY           Health Maintenance Due   Topic Date Due     ADVANCE CARE PLANNING  Never done     HIV SCREENING  Never done     HEPATITIS C SCREENING  Never done     Pneumococcal Vaccine: 50+ Years (1 of 2 - PCV) Never done     PAP  Never done     LIPID  Never done     RSV VACCINE (1 - Risk 60-74 years 1-dose series) Never done     COVID-19 Vaccine ( season) 2024     PHQ-2 (once per calendar year)  2025       Current Medications:     Current Outpatient Medications   Medication Sig Dispense Refill     bimatoprost (LATISSE) 0.03 % external opthalmic solution Apply 1 drop topically at bedtime. Apply to eyebrows/lashes       ondansetron (ZOFRAN) 8 MG tablet Take 1 tablet (8 mg) by mouth every 8 hours as needed for nausea (vomiting). Do not take for 3 days after chemo. 30 tablet 2     prochlorperazine (COMPAZINE) 10 MG tablet Take 1 tablet (10 mg) by mouth  "every 6 hours as needed for nausea or vomiting. 30 tablet 2     tretinoin (RETIN-A) 0.1 % external cream Apply 1 Application topically as needed.           Allergies:      No Known Allergies     Social History:     Social History     Tobacco Use     Smoking status: Never     Passive exposure: Past (per pt)     Smokeless tobacco: Never   Substance Use Topics     Alcohol use: Yes     Alcohol/week: 4.0 standard drinks of alcohol     Types: 4 Glasses of wine per week       History   Drug Use Unknown         Family History:     The patient's family history is notable for:    Family History   Problem Relation Age of Onset     Pulmonary Embolism Mother         unknown cause     Deep Vein Thrombosis (DVT) Mother      Anesthesia Reaction No family hx of          Physical Exam:     Ht 1.702 m (5' 7\")   Wt 60.3 kg (133 lb)   LMP  (LMP Unknown)   BMI 20.83 kg/m    Body mass index is 20.83 kg/m .    General Appearance: alert, no distress    Eyes:  Eyes grossly normal.  No obvious discharge, erythema, or scleral/conjunctival abnormalities.    Respiratory: No noted audible wheeze, cough, or visible cyanosis.  No visible retractions or increased work of breathing.     Skin: no lesions or rashes on visible skin     Psychiatric: appropriate mood and affect. Mentation appears normal, affect normal/bright, judgement and insight intact, normal speech and appearance well-groomed                            The rest of a comprehensive physical examination is deferred due to video visit restrictions.     No results found for this or any previous visit (from the past 24 hours).       Assessment:    Estelita Waters is a 64 year old woman with a diagnosis of stage IB serous endometrial cancer.  She is here today for follow up and disease management by video.      20 minutes spent on the date of the encounter doing chart review, history and exam, documentation, and further activities as noted above.      Plan:     1.)        Endometrial " cancer:  OK to proceed with planned treatment tomorrow if labs are WDL with Neulasta.  RTC as scheduled for follow up with Dr. Burt.  Reviewed signs and symptoms for when she should contact the clinic or seek additional care.  Patient to contact the clinic with any questions or concerns in the interim.      Genetic risk factors were assessed and she is scheduled for a new patient genetic counseling referral 5/12/25.  IHC  P53 ABNORMAL   P16 positive  WT1 positive  MMR intact  POLE mutation   HER2 positive equivocal (2+)      Labs and/or tests ordered include:  CBC. CMP. Mag                2.)        Health maintenance:  Issues addressed today include following up with PCP for annual health maintenance and non-gynecologic issues.      3.)        Neuropathy: Some numbness in the tips of her fingers intermittently.  Not impacting function.  No symptoms in feet.  OK to use vitamin B6 and Lglutamine if it is helpful.  OK to continue taxol at its current dose.     4.)        Bone pain: Some bone pain for a couple days after chemotherapy.  Improved with clartin daily and tylenol as needed.  Discussed she can try taking Claritin BID.      This note was created in part by the use of Dragon voice recognition dictation system. Inadvertent grammatical errors and typographical errors may exist.      Shira Edward DNP, APRN, FNP-C, AOCNP  Oncology Nurse Practitioner  Division of Gynecologic Oncology  Pager: [phone]     CC  Patient Care Team:  Katie Centeno DO as PCP - General  Wenceslao Burt MD as MD (Gynecologic Oncology)  Shira Edward APRN CNP as Assigned Cancer Care Provider  SELF, REFERRED        Again, thank you for allowing me to participate in the care of your patient.        Sincerely,        VANESA Romero CNP    Electronically signed

## 2025-02-10 NOTE — NURSING NOTE
Current patient location: [address] [place] [address] [address] [place] [address]    Is the patient currently in the state of [place]? YES    Visit mode: VIDEO    If the visit is dropped, the patient can be reconnected by:VIDEO VISIT: Text to cell phone:   Telephone Information:   Mobile [phone]       Will anyone else be joining the visit? NO  (If patient encounters technical issues they should call [phone])    Are changes needed to the allergy or medication list? Pt completed echeck-in an confirms medications and allergies are correct.      Are refills needed on medications prescribed by this physician? NO    Rooming Documentation:  Questionnaire(s) completed    Reason for visit: ALEXI BLUE

## 2025-02-11 ENCOUNTER — INFUSION THERAPY VISIT (OUTPATIENT)
Dept: INFUSION THERAPY | Facility: CLINIC | Age: 65
End: 2025-02-11
Attending: OBSTETRICS & GYNECOLOGY
Payer: COMMERCIAL

## 2025-02-11 VITALS
BODY MASS INDEX: 21.91 KG/M2 | TEMPERATURE: 98 F | DIASTOLIC BLOOD PRESSURE: 88 MMHG | HEART RATE: 54 BPM | WEIGHT: 139.6 LBS | HEIGHT: 67 IN | SYSTOLIC BLOOD PRESSURE: 153 MMHG | OXYGEN SATURATION: 100 % | RESPIRATION RATE: 16 BRPM

## 2025-02-11 DIAGNOSIS — D70.1 CHEMOTHERAPY-INDUCED NEUTROPENIA: ICD-10-CM

## 2025-02-11 DIAGNOSIS — T45.1X5A CHEMOTHERAPY-INDUCED NEUTROPENIA: ICD-10-CM

## 2025-02-11 DIAGNOSIS — C54.1 ENDOMETRIAL CANCER (H): Primary | ICD-10-CM

## 2025-02-11 DIAGNOSIS — C54.1 ENDOMETRIAL CANCER (H): ICD-10-CM

## 2025-02-11 DIAGNOSIS — T45.1X5A CHEMOTHERAPY-INDUCED NEUTROPENIA: Primary | ICD-10-CM

## 2025-02-11 DIAGNOSIS — D70.1 CHEMOTHERAPY-INDUCED NEUTROPENIA: Primary | ICD-10-CM

## 2025-02-11 LAB
ALBUMIN SERPL BCG-MCNC: 4.5 G/DL (ref 3.5–5.2)
ALP SERPL-CCNC: 97 U/L (ref 40–150)
ALT SERPL W P-5'-P-CCNC: 38 U/L (ref 0–50)
ANION GAP SERPL CALCULATED.3IONS-SCNC: 12 MMOL/L (ref 7–15)
AST SERPL W P-5'-P-CCNC: 37 U/L (ref 0–45)
BASOPHILS # BLD AUTO: 0.1 10E3/UL (ref 0–0.2)
BASOPHILS NFR BLD AUTO: 2 %
BILIRUB SERPL-MCNC: 0.5 MG/DL
BUN SERPL-MCNC: 18.8 MG/DL (ref 8–23)
CALCIUM SERPL-MCNC: 9.9 MG/DL (ref 8.8–10.4)
CHLORIDE SERPL-SCNC: 102 MMOL/L (ref 98–107)
CREAT SERPL-MCNC: 0.62 MG/DL (ref 0.51–0.95)
EGFRCR SERPLBLD CKD-EPI 2021: >90 ML/MIN/1.73M2
EOSINOPHIL # BLD AUTO: 0.1 10E3/UL (ref 0–0.7)
EOSINOPHIL NFR BLD AUTO: 1 %
ERYTHROCYTE [DISTWIDTH] IN BLOOD BY AUTOMATED COUNT: 14.2 % (ref 10–15)
GLUCOSE SERPL-MCNC: 99 MG/DL (ref 70–99)
HCO3 SERPL-SCNC: 25 MMOL/L (ref 22–29)
HCT VFR BLD AUTO: 32.6 % (ref 35–47)
HGB BLD-MCNC: 11.4 G/DL (ref 11.7–15.7)
IMM GRANULOCYTES # BLD: 0 10E3/UL
IMM GRANULOCYTES NFR BLD: 0 %
LYMPHOCYTES # BLD AUTO: 1.3 10E3/UL (ref 0.8–5.3)
LYMPHOCYTES NFR BLD AUTO: 20 %
MAGNESIUM SERPL-MCNC: 1.9 MG/DL (ref 1.7–2.3)
MCH RBC QN AUTO: 33.8 PG (ref 26.5–33)
MCHC RBC AUTO-ENTMCNC: 35 G/DL (ref 31.5–36.5)
MCV RBC AUTO: 97 FL (ref 78–100)
MONOCYTES # BLD AUTO: 0.6 10E3/UL (ref 0–1.3)
MONOCYTES NFR BLD AUTO: 9 %
NEUTROPHILS # BLD AUTO: 4.5 10E3/UL (ref 1.6–8.3)
NEUTROPHILS NFR BLD AUTO: 68 %
NRBC # BLD AUTO: 0 10E3/UL
NRBC BLD AUTO-RTO: 0 /100
PLATELET # BLD AUTO: 248 10E3/UL (ref 150–450)
POTASSIUM SERPL-SCNC: 4.6 MMOL/L (ref 3.4–5.3)
PROT SERPL-MCNC: 6.9 G/DL (ref 6.4–8.3)
RBC # BLD AUTO: 3.37 10E6/UL (ref 3.8–5.2)
SODIUM SERPL-SCNC: 139 MMOL/L (ref 135–145)
WBC # BLD AUTO: 6.5 10E3/UL (ref 4–11)

## 2025-02-11 PROCEDURE — 250N000013 HC RX MED GY IP 250 OP 250 PS 637: Performed by: NURSE PRACTITIONER

## 2025-02-11 PROCEDURE — 250N000011 HC RX IP 250 OP 636: Mod: JZ | Performed by: NURSE PRACTITIONER

## 2025-02-11 PROCEDURE — 258N000003 HC RX IP 258 OP 636: Performed by: NURSE PRACTITIONER

## 2025-02-11 PROCEDURE — 80053 COMPREHEN METABOLIC PANEL: CPT | Performed by: NURSE PRACTITIONER

## 2025-02-11 PROCEDURE — 36415 COLL VENOUS BLD VENIPUNCTURE: CPT | Performed by: NURSE PRACTITIONER

## 2025-02-11 PROCEDURE — 96415 CHEMO IV INFUSION ADDL HR: CPT

## 2025-02-11 PROCEDURE — 96375 TX/PRO/DX INJ NEW DRUG ADDON: CPT

## 2025-02-11 PROCEDURE — 96417 CHEMO IV INFUS EACH ADDL SEQ: CPT

## 2025-02-11 PROCEDURE — 96413 CHEMO IV INFUSION 1 HR: CPT

## 2025-02-11 PROCEDURE — 85049 AUTOMATED PLATELET COUNT: CPT | Performed by: NURSE PRACTITIONER

## 2025-02-11 PROCEDURE — 83735 ASSAY OF MAGNESIUM: CPT | Performed by: NURSE PRACTITIONER

## 2025-02-11 RX ORDER — PALONOSETRON 0.05 MG/ML
0.25 INJECTION, SOLUTION INTRAVENOUS ONCE
Status: COMPLETED | OUTPATIENT
Start: 2025-02-11 | End: 2025-02-11

## 2025-02-11 RX ORDER — LORATADINE 10 MG/1
10 TABLET ORAL DAILY
COMMUNITY

## 2025-02-11 RX ORDER — DIPHENHYDRAMINE HCL 25 MG
50 CAPSULE ORAL ONCE
Status: COMPLETED | OUTPATIENT
Start: 2025-02-11 | End: 2025-02-11

## 2025-02-11 RX ORDER — DEXAMETHASONE SODIUM PHOSPHATE 4 MG/ML
12 INJECTION, SOLUTION INTRA-ARTICULAR; INTRALESIONAL; INTRAMUSCULAR; INTRAVENOUS; SOFT TISSUE ONCE
Status: COMPLETED | OUTPATIENT
Start: 2025-02-11 | End: 2025-02-11

## 2025-02-11 RX ADMIN — PACLITAXEL 299 MG: 6 INJECTION, SOLUTION INTRAVENOUS at 10:22

## 2025-02-11 RX ADMIN — DIPHENHYDRAMINE HYDROCHLORIDE 50 MG: 25 CAPSULE ORAL at 09:36

## 2025-02-11 RX ADMIN — APREPITANT 130 MG: 130 INJECTION, EMULSION INTRAVENOUS at 09:50

## 2025-02-11 RX ADMIN — PALONOSETRON HYDROCHLORIDE 0.25 MG: 0.25 INJECTION INTRAVENOUS at 09:43

## 2025-02-11 RX ADMIN — SODIUM CHLORIDE 250 ML: 9 INJECTION, SOLUTION INTRAVENOUS at 09:43

## 2025-02-11 RX ADMIN — CARBOPLATIN 700 MG: 10 INJECTION, SOLUTION INTRAVENOUS at 13:26

## 2025-02-11 RX ADMIN — DEXAMETHASONE SODIUM PHOSPHATE 12 MG: 4 INJECTION INTRA-ARTICULAR; INTRALESIONAL; INTRAMUSCULAR; INTRAVENOUS; SOFT TISSUE at 09:45

## 2025-02-11 RX ADMIN — FAMOTIDINE 20 MG: 10 INJECTION, SOLUTION INTRAVENOUS at 09:55

## 2025-02-11 ASSESSMENT — PAIN SCALES - GENERAL: PAINLEVEL_OUTOF10: NO PAIN (0)

## 2025-02-11 NOTE — PROGRESS NOTES
Medical Assistant Note:  Estelita CARMELO Waters presents today for blood draw.    Patient seen by provider today: No.   present during visit today: Not Applicable.    Concerns: No Concerns.    Procedure:  Lab draw site: rac, Needle type: bf, Gauge: 23.    Post Assessment:  Labs drawn without difficulty: Yes.    Discharge Plan:  Departure Mode: Ambulatory.    Face to Face Time: 5 min.    Karissa Morfin, CMA

## 2025-02-11 NOTE — PROGRESS NOTES
"Infusion Nursing Note:  Estelita Waters presents today for C6D1 Taxol, Carboplatin (#6).    Patient seen by provider today: No   present during visit today: Not Applicable.    Note: Patient had virtual visit yesterday with Shira Edward NP, denies any new concerns overnight. Per note from Shira, \"OK to proceed with planned treatment tomorrow if labs are WDL with Neulasta.\" Patient uses own supply of ice packs for hands and feet during taxol infusion.      Intravenous Access:  Peripheral IV placed. Difficult IV start today, took 3 attempts.    Treatment Conditions:  Lab Results   Component Value Date    HGB 11.4 (L) 02/11/2025    WBC 6.5 02/11/2025    ANEUTAUTO 4.5 02/11/2025     02/11/2025        Lab Results   Component Value Date     02/11/2025    POTASSIUM 4.6 02/11/2025    MAG 1.9 02/11/2025    CR 0.62 02/11/2025    REBECCA 9.9 02/11/2025    BILITOTAL 0.5 02/11/2025    ALBUMIN 4.5 02/11/2025    ALT 38 02/11/2025    AST 37 02/11/2025     Results reviewed, labs MET treatment parameters, ok to proceed with treatment.      Post Infusion Assessment:  Patient tolerated infusion without incident.  Blood return noted pre and post infusion.  Site patent and intact, free from redness, edema or discomfort.  No evidence of extravasations.  Access discontinued per protocol.       Discharge Plan:   Discharge instructions reviewed with: Patient.  Patient and/or family verbalized understanding of discharge instructions and all questions answered.  AVS to patient via [employer].  Patient will return 2/12/25 for next appointment.   Patient discharged in stable condition accompanied by: self.  Departure Mode: Ambulatory.      Rosie Caro RN    "

## 2025-02-12 ENCOUNTER — ALLIED HEALTH/NURSE VISIT (OUTPATIENT)
Dept: INFUSION THERAPY | Facility: CLINIC | Age: 65
End: 2025-02-12
Attending: FAMILY MEDICINE
Payer: COMMERCIAL

## 2025-02-12 VITALS
HEART RATE: 69 BPM | RESPIRATION RATE: 16 BRPM | OXYGEN SATURATION: 99 % | SYSTOLIC BLOOD PRESSURE: 112 MMHG | TEMPERATURE: 97.4 F | DIASTOLIC BLOOD PRESSURE: 75 MMHG

## 2025-02-12 DIAGNOSIS — D70.1 CHEMOTHERAPY-INDUCED NEUTROPENIA: ICD-10-CM

## 2025-02-12 DIAGNOSIS — T45.1X5A CHEMOTHERAPY-INDUCED NEUTROPENIA: ICD-10-CM

## 2025-02-12 DIAGNOSIS — C54.1 ENDOMETRIAL CANCER (H): Primary | ICD-10-CM

## 2025-02-12 PROCEDURE — 96372 THER/PROPH/DIAG INJ SC/IM: CPT | Performed by: NURSE PRACTITIONER

## 2025-02-12 PROCEDURE — 250N000011 HC RX IP 250 OP 636: Mod: JZ | Performed by: NURSE PRACTITIONER

## 2025-02-12 RX ADMIN — PEGFILGRASTIM-JMDB 6 MG: 6 INJECTION SUBCUTANEOUS at 12:49

## 2025-02-12 NOTE — PROGRESS NOTES
Nursing Note:  Estelita Waters presents today for C6D2: Fulphila.    Patient seen by provider today: No   present during visit today: Not Applicable.    Note: Patient reports to feeling well today since since her chemotherapy yesterday.  Denies any new concerns. Tolerated one Fulphila injection to left abdomen without issues.    Intravenous Access:  No Intravenous access/labs at this visit.    Discharge Plan:   Patient was sent to home in stable condition.    Helen Randall RN

## 2025-06-11 ENCOUNTER — ONCOLOGY VISIT (OUTPATIENT)
Dept: ONCOLOGY | Facility: HOSPITAL | Age: 65
End: 2025-06-11
Attending: OBSTETRICS & GYNECOLOGY
Payer: MEDICARE

## 2025-06-11 VITALS
SYSTOLIC BLOOD PRESSURE: 142 MMHG | WEIGHT: 138 LBS | HEART RATE: 74 BPM | BODY MASS INDEX: 21.61 KG/M2 | OXYGEN SATURATION: 100 % | DIASTOLIC BLOOD PRESSURE: 82 MMHG | RESPIRATION RATE: 16 BRPM

## 2025-06-11 DIAGNOSIS — C54.1 ENDOMETRIAL CANCER (H): Primary | ICD-10-CM

## 2025-06-11 PROCEDURE — 99215 OFFICE O/P EST HI 40 MIN: CPT | Performed by: OBSTETRICS & GYNECOLOGY

## 2025-06-11 PROCEDURE — G0463 HOSPITAL OUTPT CLINIC VISIT: HCPCS | Performed by: OBSTETRICS & GYNECOLOGY

## 2025-06-11 ASSESSMENT — PAIN SCALES - GENERAL: PAINLEVEL_OUTOF10: NO PAIN (0)

## 2025-06-11 NOTE — NURSING NOTE
"Oncology Rooming Note    June 11, 2025 1:12 PM   Estelita Waters is a 65 year old female who presents for:    Chief Complaint   Patient presents with    Oncology Clinic Visit     Gyn Onc follow up     Initial Vitals: BP (!) 142/82   Pulse 74   Resp 16   Wt 62.6 kg (138 lb)   LMP  (LMP Unknown)   SpO2 100%   BMI 21.61 kg/m   Estimated body mass index is 21.61 kg/m  as calculated from the following:    Height as of 2/11/25: 1.702 m (5' 7.01\").    Weight as of this encounter: 62.6 kg (138 lb). Body surface area is 1.72 meters squared.  No Pain (0) Comment: Data Unavailable   No LMP recorded (lmp unknown). Patient is postmenopausal.  Allergies reviewed: Yes  Medications reviewed: Yes    Medications: Medication refills not needed today.  Pharmacy name entered into [employer]: [id number] [address] [address]    Frailty Screening:   Is the patient here for a new oncology consult visit in cancer care? 2. No    PHQ9:  Did this patient require a PHQ9?: No      Clinical concerns: None, feeling well.  Dr. Burt was NOT notified.      Asia Dela Cruz RN              "

## 2025-06-11 NOTE — LETTER
2025      Estelita Waters  [address] [place] [address]      Dear Colleague,    Thank you for referring your patient, Estelita Waters, to the [facility]. Please see a copy of my visit note below.                Follow Up Notes on Referred Patient    Date: 2025       Dr. Wenceslao Burt MD  [address]  [place] [address]       RE: Estelita Waters  : 1960  KAYLEIGH: 2025    CC: Stage IB serous endometrial cancer      HPI:  Estelita Waters is a 64 year old woman with a diagnosis of stage IB serous endometrial cancer.  She is here today for follow up.  She has been doing well since she finished her chemotherapy ulnar neuropathy has resolved she is eating and drinking well is no nausea vomiting fever chills normal urinary bowel function.  No vaginal bleeding or discharge.  She has remained quite active walks at least 5 miles a day in general analysis done recent hike 20 miles an the [place].     Oncology History:  Initially, she presented with PMB.     24: Pelvic US  IMPRESSION:   1. Circumscribed hyperechoic mass centered in the endometrium measuring up to   1.9 cm. Differential considerations include a submucosal fibroid, large   endometrial polyp and endometrial malignancy. Recommend tissue sampling for   further evaluation. Female pelvic MRI may provide additional diagnostic   information, could be considered for further evaluation.   2. Fibroid uterus.   3. No suspicious adnexal mass.      24: Diagnostic hysteroscopy, hysteroscopic myomectomy, dilation and curettage    A.  Endometrium, Endometrial curettings and fibroid, curettage:    High-grade carcinoma possibly arising in a polyp, favor serous,  pending POLE mutational status, see comment  Comment:  Stain Results Comments   P53 Negative, P53 ABNORMAL All performed on Block B4   P16 positive     WT1 positive     P53 shows completely absent staining of the epithelial component  (abnormal) but wild type staining in the stromal elements (internal control).  MMR intact.   No mutations were detected in POLE.  The measured tumor mutation burden (TMB) is low in this sample.     9/4/24:  12.  CT CAP  IMPRESSION:  1.  No evidence of metastatic disease in the chest, abdomen, or  pelvis.     9/24/24: HYSTERECTOMY, TOTAL, ROBOT-ASSISTED, WITH BILATERAL SALPINGO-OOPHORECTOMY, AND BILATERAL PELVIC sentinel sampling, omentectomy   A. Lymph node, RIGHT para-aortic, excision:  - One lymph node, negative for carcinoma (0/1)  B. Lymph node, LEFT external iliac, sentinel, biopsy:  - One lymph node, negative for carcinoma (0/1)  C. Uterus, cervix, bilateral fallopian tubes and ovaries, total laparoscopic hysterectomy with bilateral salpingo-oophorectomy:  - SEROUS CARCINOMA OF THE ENDOMETRIUM (size: 6.1 cm)  - Carcinoma invades 8 mm into a 14 mm myometrium (57%)  - Myometrial leiomyomas (largest: 3.2 cm) with hyalinization and calcification  - Cervix with Nabothian cysts   - Bilateral ovaries with focal surface adhesions  - Bilateral fallopian tubes with paratubal cysts and focal surface adhesions  - See tumor synoptic report below  D. Omentum, omentectomy:  - Fibroadipose tissue, negative for malignancy  Washings negative.  HER2 +2         Plan: Carboplatin AUC 6 and paclitaxel 175 mg/m2 IV every 3 weeks x 6 cycles.     10/22/24: Cycle 1 carboplatin and paclitaxel.  11/13/24: Cycle 2 carboplatin and paclitaxel.  12/4/24: Cycle 3 carboplatin and paclitaxel.  12/23/24: Cycle 4 carboplatin and paclitaxel, deferred x 1 week due to neutropenia (ANC 1.0).  Given 12/31/24, + Neulasta.  1/21/25: Cycle 5 carboplatin and paclitaxel.  Neulasta.   2/11/25: Cycle 6 carboplatin and paclitaxel planned.  Neulasta.                       Past Medical History:    Past Medical History:   Diagnosis Date     History of melanoma      Uterine cancer (H)          Past Surgical History:    Past Surgical History:   Procedure  Laterality Date      SECTION       DAVINCI HYSTERECTOMY TOTAL, BILATERAL SALPINGO-OOPHORECTOMY, NODE DISSECTION, COMBINED Bilateral 2024    Procedure: HYSTERECTOMY, TOTAL, ROBOT-ASSISTED, WITH BILATERAL SALPINGO-OOPHORECTOMY, AND BILATERAL PELVIC sentinel sampling, omentectomy;  Surgeon: Wenceslao Burt MD;  Location: [facility] OR     HYSTERECTOMY, TOTAL, ROBOT-ASSISTED, WITH BILATERAL SALPINGO-OOPHORECTOMY, AND BILATERAL PELVIC sentinel sampling, omentectomy - Bilateral  2024     MOHS MICROGRAPHIC PROCEDURE       NV BREAST AUGMENTATION       WRIST SURGERY           Health Maintenance Due   Topic Date Due     DEXA  Never done     ADVANCE CARE PLANNING  Never done     HIV SCREENING  Never done     HEPATITIS C SCREENING  Never done     PNEUMOCOCCAL VACCINE 50+ YEARS (1 of 2 - PCV) Never done     LIPID  Never done     RSV VACCINE (1 - Risk 60-74 years 1-dose series) Never done     COVID-19 VACCINE ( season) 2024       Current Medications:     Current Outpatient Medications   Medication Sig Dispense Refill     bimatoprost (LATISSE) 0.03 % external opthalmic solution Apply 1 drop topically at bedtime. Apply to eyebrows/lashes       tretinoin (RETIN-A) 0.1 % external cream Apply 1 Application topically as needed.       ondansetron (ZOFRAN) 8 MG tablet Take 1 tablet (8 mg) by mouth every 8 hours as needed for nausea (vomiting). Do not take for 3 days after chemo. (Patient not taking: Reported on 2025) 30 tablet 2     prochlorperazine (COMPAZINE) 10 MG tablet Take 1 tablet (10 mg) by mouth every 6 hours as needed for nausea or vomiting. (Patient not taking: Reported on 2025) 30 tablet 2         Allergies:      No Known Allergies     Social History:     Social History     Tobacco Use     Smoking status: Never     Passive exposure: Past (per pt)     Smokeless tobacco: Never   Substance Use Topics     Alcohol use: Yes     Alcohol/week: 4.0 standard drinks of alcohol     Types: 4  Glasses of wine per week       History   Drug Use Unknown         Family History:       Family History   Problem Relation Age of Onset     Pulmonary Embolism Mother         unknown cause     Deep Vein Thrombosis (DVT) Mother      Anesthesia Reaction No family hx of          Physical Exam:     BP (!) 142/82   Pulse 74   Resp 16   Wt 62.6 kg (138 lb)   LMP  (LMP Unknown)   SpO2 100%   BMI 21.61 kg/m    Body mass index is 21.61 kg/m .    General Appearance: healthy and alert, no distress     Musculoskeletal: extremities non tender and without edema    Skin: no lesions or rashes     Neurological: normal gait, no gross defects     Psychiatric: appropriate mood and affect                               Hematological: normal cervical, supraclavicular and inguinal lymph nodes     Gastrointestinal:       abdomen soft, non-tender, non-distended, no organomegaly or masses, well-healing port site incisions.    Genitourinary: External genitalia and urethral meatus appears normal.  Vagina is smooth without nodularity or masses.  Well-healed vaginal cuff intact.  Bimanual exam reveal no masses, nodularity or fullness.  Recto-vaginal exam confirms these findings.      Assessment:    Estelita Waters is a 65 year old woman with a diagnosis of stage IB serous endometrial cancer.     A total of 42 minutes was spent with the patient, chart review, documentation, coordination of care, in counseling the patient and/or treatment planning.      stage IB serous endometrial cancer  S/p 6 cycles of adjuvant chemotherapy  JUAN    I discussed with the patient she has no evidence of disease. We discussed to continue with surveillance visits every 3 to 4 months for the first 2 years followed by every 6 months for the next 3 years followed by yearly after that.  Signs and symptoms of recurrence were discussed in detail. agrees with this plan she is very appreciative of her care all questions were answered.  Patient          Wenceslao MEREDITH  MD [place], MS    Department of Obstetrics and Gynecology   Division of Gynecologic Oncology   [facility]  Phone: [phone]        CC  Patient Care Team:  Katie Centeno DO as PCP - General  Denver Solorio MD as MD (Gynecologic Oncology)  Shira Edward APRN CNP as Assigned Cancer Care Provider  DENVER SOLORIO      Again, thank you for allowing me to participate in the care of your patient.        Sincerely,        Denver Solorio MD    Electronically signed

## 2025-06-11 NOTE — PROGRESS NOTES
Follow Up Notes on Referred Patient    Date: 2025       Dr. Wenceslao Burt MD  [address] [place] [address]       RE: Estelita Waters  : 1960  KAYLEIGH: 2025    CC: Stage IB serous endometrial cancer      HPI:  Estelita Waters is a 64 year old woman with a diagnosis of stage IB serous endometrial cancer.  She is here today for follow up.  She has been doing well since she finished her chemotherapy ulnar neuropathy has resolved she is eating and drinking well is no nausea vomiting fever chills normal urinary bowel function.  No vaginal bleeding or discharge.  She has remained quite active walks at least 5 miles a day in general analysis done recent hike 20 miles an the [employer] [place].     Oncology History:  Initially, she presented with PMB.     24: Pelvic US  IMPRESSION:   1. Circumscribed hyperechoic mass centered in the endometrium measuring up to   1.9 cm. Differential considerations include a submucosal fibroid, large   endometrial polyp and endometrial malignancy. Recommend tissue sampling for   further evaluation. Female pelvic MRI may provide additional diagnostic   information, could be considered for further evaluation.   2. Fibroid uterus.   3. No suspicious adnexal mass.      24: Diagnostic hysteroscopy, hysteroscopic myomectomy, dilation and curettage    A.  Endometrium, Endometrial curettings and fibroid, curettage:    High-grade carcinoma possibly arising in a polyp, favor serous,  pending POLE mutational status, see comment  Comment:  Stain Results Comments   P53 Negative, P53 ABNORMAL All performed on Block B4   P16 positive     WT1 positive     P53 shows completely absent staining of the epithelial component (abnormal) but wild type staining in the stromal elements (internal control).  MMR intact.   No mutations were detected in POLE.  The measured tumor mutation burden (TMB) is low in this sample.     24:  12.  CT CAP  IMPRESSION:  1.  No  evidence of metastatic disease in the chest, abdomen, or  pelvis.     24: HYSTERECTOMY, TOTAL, ROBOT-ASSISTED, WITH BILATERAL SALPINGO-OOPHORECTOMY, AND BILATERAL PELVIC sentinel sampling, omentectomy   A. Lymph node, RIGHT para-aortic, excision:  - One lymph node, negative for carcinoma (0/1)  B. Lymph node, LEFT external iliac, sentinel, biopsy:  - One lymph node, negative for carcinoma (0/1)  C. Uterus, cervix, bilateral fallopian tubes and ovaries, total laparoscopic hysterectomy with bilateral salpingo-oophorectomy:  - SEROUS CARCINOMA OF THE ENDOMETRIUM (size: 6.1 cm)  - Carcinoma invades 8 mm into a 14 mm myometrium (57%)  - Myometrial leiomyomas (largest: 3.2 cm) with hyalinization and calcification  - Cervix with Nabothian cysts   - Bilateral ovaries with focal surface adhesions  - Bilateral fallopian tubes with paratubal cysts and focal surface adhesions  - See tumor synoptic report below  D. Omentum, omentectomy:  - Fibroadipose tissue, negative for malignancy  Washings negative.  HER2 +2         Plan: Carboplatin AUC 6 and paclitaxel 175 mg/m2 IV every 3 weeks x 6 cycles.     10/22/24: Cycle 1 carboplatin and paclitaxel.  24: Cycle 2 carboplatin and paclitaxel.  24: Cycle 3 carboplatin and paclitaxel.  24: Cycle 4 carboplatin and paclitaxel, deferred x 1 week due to neutropenia (ANC 1.0).  Given 24, + Neulasta.  25: Cycle 5 carboplatin and paclitaxel.  Neulasta.   25: Cycle 6 carboplatin and paclitaxel planned.  Neulasta.                       Past Medical History:    Past Medical History:   Diagnosis Date    History of melanoma     Uterine cancer (H)          Past Surgical History:    Past Surgical History:   Procedure Laterality Date     SECTION      DAVINCI HYSTERECTOMY TOTAL, BILATERAL SALPINGO-OOPHORECTOMY, NODE DISSECTION, COMBINED Bilateral 2024    Procedure: HYSTERECTOMY, TOTAL, ROBOT-ASSISTED, WITH BILATERAL SALPINGO-OOPHORECTOMY, AND BILATERAL  PELVIC sentinel sampling, omentectomy;  Surgeon: Wenceslao Burt MD;  Location: [facility] OR    HYSTERECTOMY, TOTAL, ROBOT-ASSISTED, WITH BILATERAL SALPINGO-OOPHORECTOMY, AND BILATERAL PELVIC sentinel sampling, omentectomy - Bilateral  09/24/2024    MOHS MICROGRAPHIC PROCEDURE      NE BREAST AUGMENTATION      WRIST SURGERY           Health Maintenance Due   Topic Date Due    DEXA  Never done    ADVANCE CARE PLANNING  Never done    HIV SCREENING  Never done    HEPATITIS C SCREENING  Never done    PNEUMOCOCCAL VACCINE 50+ YEARS (1 of 2 - PCV) Never done    LIPID  Never done    RSV VACCINE (1 - Risk 60-74 years 1-dose series) Never done    COVID-19 VACCINE (4 - 2024-25 season) 09/01/2024       Current Medications:     Current Outpatient Medications   Medication Sig Dispense Refill    bimatoprost (LATISSE) 0.03 % external opthalmic solution Apply 1 drop topically at bedtime. Apply to eyebrows/lashes      tretinoin (RETIN-A) 0.1 % external cream Apply 1 Application topically as needed.      ondansetron (ZOFRAN) 8 MG tablet Take 1 tablet (8 mg) by mouth every 8 hours as needed for nausea (vomiting). Do not take for 3 days after chemo. (Patient not taking: Reported on 6/11/2025) 30 tablet 2    prochlorperazine (COMPAZINE) 10 MG tablet Take 1 tablet (10 mg) by mouth every 6 hours as needed for nausea or vomiting. (Patient not taking: Reported on 6/11/2025) 30 tablet 2         Allergies:      No Known Allergies     Social History:     Social History     Tobacco Use    Smoking status: Never     Passive exposure: Past (per pt)    Smokeless tobacco: Never   Substance Use Topics    Alcohol use: Yes     Alcohol/week: 4.0 standard drinks of alcohol     Types: 4 Glasses of wine per week       History   Drug Use Unknown         Family History:       Family History   Problem Relation Age of Onset    Pulmonary Embolism Mother         unknown cause    Deep Vein Thrombosis (DVT) Mother     Anesthesia Reaction No family hx of           Physical Exam:     BP (!) 142/82   Pulse 74   Resp 16   Wt 62.6 kg (138 lb)   LMP  (LMP Unknown)   SpO2 100%   BMI 21.61 kg/m    Body mass index is 21.61 kg/m .    General Appearance: healthy and alert, no distress     Musculoskeletal: extremities non tender and without edema    Skin: no lesions or rashes     Neurological: normal gait, no gross defects     Psychiatric: appropriate mood and affect                               Hematological: normal cervical, supraclavicular and inguinal lymph nodes     Gastrointestinal:       abdomen soft, non-tender, non-distended, no organomegaly or masses, well-healing port site incisions.    Genitourinary: External genitalia and urethral meatus appears normal.  Vagina is smooth without nodularity or masses.  Well-healed vaginal cuff intact.  Bimanual exam reveal no masses, nodularity or fullness.  Recto-vaginal exam confirms these findings.      Assessment:    Estelita Waters is a 65 year old woman with a diagnosis of stage IB serous endometrial cancer.     A total of 42 minutes was spent with the patient, chart review, documentation, coordination of care, in counseling the patient and/or treatment planning.      stage IB serous endometrial cancer  S/p 6 cycles of adjuvant chemotherapy  JUAN    I discussed with the patient she has no evidence of disease. We discussed to continue with surveillance visits every 3 to 4 months for the first 2 years followed by every 6 months for the next 3 years followed by yearly after that.  Signs and symptoms of recurrence were discussed in detail. agrees with this plan she is very appreciative of her care all questions were answered.  Patient          Wenceslao Burt MD, MS    Department of Obstetrics and Gynecology   Division of Gynecologic Oncology   [facility]  Phone: [phone]        CC  Patient Care Team:  Katie Centeno DO as PCP - General  Wenceslao Burt MD as MD  (Gynecologic Oncology)  Shira Edward, VANESA CNP as Assigned Cancer Care Provider  DENVER SOLORIO

## (undated) DEVICE — LINEN TOWEL PACK X6 WHITE 5487

## (undated) DEVICE — DAVINCI XI DRAPE COLUMN 470341

## (undated) DEVICE — DRAPE SHEET REV FOLD 3/4 9349

## (undated) DEVICE — SYR 10ML LL W/O NDL 302995

## (undated) DEVICE — Device

## (undated) DEVICE — KIT PATIENT POSITIONING PIGAZZI LATEX FREE 40580

## (undated) DEVICE — DAVINCI XI GRASPER PROGRASP 8MM EXT 471093

## (undated) DEVICE — ESU GROUND PAD ADULT REM W/15' CORD E7507DB

## (undated) DEVICE — JELLY LUBRICATING SURGILUBE 2OZ TUBE

## (undated) DEVICE — NDL SPINAL 22GA 3.5" QUINCKE 405181

## (undated) DEVICE — DAVINCI XI MONOPOLAR SCISSORS HOT SHEARS 8MM 470179

## (undated) DEVICE — SU MONOCRYL 3-0 PS-1 27" Y936H

## (undated) DEVICE — KOH COLPOTOMIZER OCCLUDER  CPO-6

## (undated) DEVICE — DAVINCI XI SEAL UNIVERSAL 5-12MM 470500

## (undated) DEVICE — PACK GOWN 3/PK DISP XL SBA32GPFCB

## (undated) DEVICE — CATH TRAY FOLEY SURESTEP 16FR W/URNE MTR STLK LATEX A303316A

## (undated) DEVICE — SU VICRYL 2-0 CT-2 27" J333H

## (undated) DEVICE — PROTECTOR ARM ONE-STEP TRENDELENBURG 40418 (COI)

## (undated) DEVICE — DRSG PRIMAPORE 02X3" 7133

## (undated) DEVICE — DAVINCI XI DRAPE ARM 470015

## (undated) DEVICE — DAVINCI XI DRIVER NDL MEGA SUTURECUT 8MM EXT 471309

## (undated) DEVICE — SOL WATER IRRIG 1000ML BOTTLE 2F7114

## (undated) DEVICE — PREP DYNA-HEX 4% CHG SCRUB 4OZ BOTTLE MDS098710

## (undated) DEVICE — DAVINCI HOT SHEARS TIP COVER  400180

## (undated) DEVICE — DRAPE MAYO STAND 23X54 8337

## (undated) DEVICE — SUCTION IRR STRYKERFLOW II W/TIP 250-070-520

## (undated) DEVICE — SOL NACL 0.9% INJ 1000ML BAG 2B1324X

## (undated) DEVICE — PREP CHLORAPREP 26ML TINTED HI-LITE ORANGE 930815

## (undated) DEVICE — DAVINCI XI HANDPIECE ESU VESSEL SEALER 8MM EXT 480422

## (undated) DEVICE — LINEN TOWEL PACK X30 5481

## (undated) DEVICE — NDL INSUFFLATION 13GA 120MM C2201

## (undated) DEVICE — GLOVE BIOGEL PI MICRO SZ 7.5 48575

## (undated) DEVICE — NDL BLUNT 18GA 1" W/O FILTER 305181

## (undated) DEVICE — SUCTION MANIFOLD NEPTUNE 2 SYS 4 PORT 0702-020-000

## (undated) DEVICE — ENDO OBTURATOR ACCESS PORT BLADELESS 8X100MM IAS8-100LP

## (undated) DEVICE — SPECIMEN TRAP MUCOUS 40ML LUKI C30200A

## (undated) DEVICE — SU VICRYL+ 0 54 UNDYED VCP608H

## (undated) DEVICE — DAVINCI XI OBTURATOR BLADELESS 8MM 470359

## (undated) DEVICE — GLOVE BIOGEL PI MICRO INDICATOR UNDERGLOVE SZ 8.0 48980

## (undated) DEVICE — ANTIFOG SOLUTION SEE SHARP 150M TROCAR SWABS 30978 (COI)

## (undated) DEVICE — SU VICRYL 0 CT-2 27" J334H

## (undated) DEVICE — TUBING FILTER TRI-LUMEN AIRSEAL ASC-EVAC1

## (undated) RX ORDER — FENTANYL CITRATE 50 UG/ML
INJECTION, SOLUTION INTRAMUSCULAR; INTRAVENOUS
Status: DISPENSED
Start: 2024-09-24

## (undated) RX ORDER — PHENAZOPYRIDINE HYDROCHLORIDE 200 MG/1
TABLET, FILM COATED ORAL
Status: DISPENSED
Start: 2024-09-24

## (undated) RX ORDER — ONDANSETRON 2 MG/ML
INJECTION INTRAMUSCULAR; INTRAVENOUS
Status: DISPENSED
Start: 2024-09-24

## (undated) RX ORDER — HYDROMORPHONE HCL IN WATER/PF 6 MG/30 ML
PATIENT CONTROLLED ANALGESIA SYRINGE INTRAVENOUS
Status: DISPENSED
Start: 2024-09-24

## (undated) RX ORDER — DEXAMETHASONE SODIUM PHOSPHATE 4 MG/ML
INJECTION, SOLUTION INTRA-ARTICULAR; INTRALESIONAL; INTRAMUSCULAR; INTRAVENOUS; SOFT TISSUE
Status: DISPENSED
Start: 2024-09-24

## (undated) RX ORDER — CEFAZOLIN SODIUM/WATER 2 G/20 ML
SYRINGE (ML) INTRAVENOUS
Status: DISPENSED
Start: 2024-09-24

## (undated) RX ORDER — FENTANYL CITRATE-0.9 % NACL/PF 10 MCG/ML
PLASTIC BAG, INJECTION (ML) INTRAVENOUS
Status: DISPENSED
Start: 2024-09-24

## (undated) RX ORDER — ACETAMINOPHEN 325 MG/1
TABLET ORAL
Status: DISPENSED
Start: 2024-09-24

## (undated) RX ORDER — OXYCODONE HYDROCHLORIDE 5 MG/1
TABLET ORAL
Status: DISPENSED
Start: 2024-09-24

## (undated) RX ORDER — GLYCOPYRROLATE 0.2 MG/ML
INJECTION, SOLUTION INTRAMUSCULAR; INTRAVENOUS
Status: DISPENSED
Start: 2024-09-24

## (undated) RX ORDER — HYDROMORPHONE HYDROCHLORIDE 1 MG/ML
INJECTION, SOLUTION INTRAMUSCULAR; INTRAVENOUS; SUBCUTANEOUS
Status: DISPENSED
Start: 2024-09-24

## (undated) RX ORDER — HEPARIN SODIUM 5000 [USP'U]/.5ML
INJECTION, SOLUTION INTRAVENOUS; SUBCUTANEOUS
Status: DISPENSED
Start: 2024-09-24